# Patient Record
Sex: FEMALE | Race: WHITE | NOT HISPANIC OR LATINO | Employment: FULL TIME | URBAN - METROPOLITAN AREA
[De-identification: names, ages, dates, MRNs, and addresses within clinical notes are randomized per-mention and may not be internally consistent; named-entity substitution may affect disease eponyms.]

---

## 2023-06-14 PROBLEM — I25.119 CORONARY ARTERY DISEASE INVOLVING NATIVE HEART WITH ANGINA PECTORIS: Status: ACTIVE | Noted: 2023-06-14

## 2023-06-14 PROBLEM — I21.4 NSTEMI (NON-ST ELEVATED MYOCARDIAL INFARCTION): Status: ACTIVE | Noted: 2023-06-14

## 2023-06-15 ENCOUNTER — ANESTHESIA EVENT (OUTPATIENT)
Dept: PERIOP | Facility: HOSPITAL | Age: 63
End: 2023-06-15
Payer: COMMERCIAL

## 2023-06-15 NOTE — ANESTHESIA PREPROCEDURE EVALUATION
Anesthesia Evaluation                  Airway   Mallampati: I  TM distance: >3 FB  Neck ROM: full  No difficulty expected  Dental      Pulmonary    Cardiovascular     ECG reviewed    (+) hypertension, past MI , CAD, angina,       Neuro/Psych  (+) psychiatric history,    GI/Hepatic/Renal/Endo    (+)   diabetes mellitus,     Musculoskeletal     Abdominal    Substance History      OB/GYN          Other                      Anesthesia Plan    ASA 4     general     (A line  cvp   pac)  intravenous induction     Anesthetic plan, risks, benefits, and alternatives have been provided, discussed and informed consent has been obtained with: patient.        CODE STATUS:

## 2023-06-16 ENCOUNTER — ANESTHESIA (OUTPATIENT)
Dept: PERIOP | Facility: HOSPITAL | Age: 63
End: 2023-06-16
Payer: COMMERCIAL

## 2023-06-16 PROBLEM — E78.5 HYPERLIPIDEMIA: Status: ACTIVE | Noted: 2023-06-16

## 2023-06-16 PROBLEM — E11.9 TYPE 2 DIABETES MELLITUS: Status: ACTIVE | Noted: 2023-06-16

## 2023-06-16 PROBLEM — I10 HYPERTENSION: Status: ACTIVE | Noted: 2023-06-16

## 2023-06-16 PROBLEM — E66.9 OBESITY (BMI 30-39.9): Status: ACTIVE | Noted: 2023-06-16

## 2023-06-16 PROBLEM — Z95.1 S/P CABG X 2: Status: ACTIVE | Noted: 2023-06-16

## 2023-06-16 PROBLEM — M06.9 RHEUMATOID ARTHRITIS: Status: ACTIVE | Noted: 2023-06-16

## 2023-06-16 PROCEDURE — 25010000002 HEPARIN (PORCINE) PER 1000 UNITS: Performed by: ANESTHESIOLOGY

## 2023-06-16 PROCEDURE — P9041 ALBUMIN (HUMAN),5%, 50ML: HCPCS | Performed by: ANESTHESIOLOGY

## 2023-06-16 PROCEDURE — C1751 CATH, INF, PER/CENT/MIDLINE: HCPCS | Performed by: ANESTHESIOLOGY

## 2023-06-16 PROCEDURE — 25010000002 MIDAZOLAM PER 1 MG: Performed by: ANESTHESIOLOGY

## 2023-06-16 PROCEDURE — 93318 ECHO TRANSESOPHAGEAL INTRAOP: CPT | Performed by: ANESTHESIOLOGY

## 2023-06-16 PROCEDURE — 25010000002 CEFAZOLIN IN DEXTROSE 2-4 GM/100ML-% SOLUTION: Performed by: STUDENT IN AN ORGANIZED HEALTH CARE EDUCATION/TRAINING PROGRAM

## 2023-06-16 PROCEDURE — 25010000002 FENTANYL CITRATE (PF) 50 MCG/ML SOLUTION: Performed by: ANESTHESIOLOGY

## 2023-06-16 PROCEDURE — 25010000002 FENTANYL CITRATE (PF) 1000 MCG/20ML SOLUTION: Performed by: ANESTHESIOLOGY

## 2023-06-16 PROCEDURE — 25010000002 ACETAMINOPHEN 10 MG/ML SOLUTION: Performed by: ANESTHESIOLOGY

## 2023-06-16 PROCEDURE — 25010000002 PROTAMINE SULFATE PER 10 MG: Performed by: ANESTHESIOLOGY

## 2023-06-16 PROCEDURE — 25010000002 ALBUMIN HUMAN 5% PER 50 ML: Performed by: ANESTHESIOLOGY

## 2023-06-16 PROCEDURE — 25010000002 PROPOFOL 10 MG/ML EMULSION: Performed by: ANESTHESIOLOGY

## 2023-06-16 RX ORDER — PROTAMINE SULFATE 10 MG/ML
INJECTION, SOLUTION INTRAVENOUS AS NEEDED
Status: DISCONTINUED | OUTPATIENT
Start: 2023-06-16 | End: 2023-06-16 | Stop reason: SURG

## 2023-06-16 RX ORDER — ALBUMIN, HUMAN INJ 5% 5 %
SOLUTION INTRAVENOUS CONTINUOUS PRN
Status: DISCONTINUED | OUTPATIENT
Start: 2023-06-16 | End: 2023-06-16 | Stop reason: SURG

## 2023-06-16 RX ORDER — CALCIUM CHLORIDE 100 MG/ML
INJECTION INTRAVENOUS; INTRAVENTRICULAR AS NEEDED
Status: DISCONTINUED | OUTPATIENT
Start: 2023-06-16 | End: 2023-06-16 | Stop reason: SURG

## 2023-06-16 RX ORDER — FENTANYL CITRATE 50 UG/ML
INJECTION, SOLUTION INTRAMUSCULAR; INTRAVENOUS AS NEEDED
Status: DISCONTINUED | OUTPATIENT
Start: 2023-06-16 | End: 2023-06-16 | Stop reason: SURG

## 2023-06-16 RX ORDER — ACETAMINOPHEN 10 MG/ML
INJECTION, SOLUTION INTRAVENOUS AS NEEDED
Status: DISCONTINUED | OUTPATIENT
Start: 2023-06-16 | End: 2023-06-16 | Stop reason: SURG

## 2023-06-16 RX ORDER — ESMOLOL HYDROCHLORIDE 10 MG/ML
INJECTION INTRAVENOUS AS NEEDED
Status: DISCONTINUED | OUTPATIENT
Start: 2023-06-16 | End: 2023-06-16 | Stop reason: SURG

## 2023-06-16 RX ORDER — LABETALOL HYDROCHLORIDE 5 MG/ML
INJECTION, SOLUTION INTRAVENOUS AS NEEDED
Status: DISCONTINUED | OUTPATIENT
Start: 2023-06-16 | End: 2023-06-16 | Stop reason: SURG

## 2023-06-16 RX ORDER — ROCURONIUM BROMIDE 10 MG/ML
INJECTION, SOLUTION INTRAVENOUS AS NEEDED
Status: DISCONTINUED | OUTPATIENT
Start: 2023-06-16 | End: 2023-06-16 | Stop reason: SURG

## 2023-06-16 RX ORDER — AMINOCAPROIC ACID 250 MG/ML
INJECTION, SOLUTION INTRAVENOUS AS NEEDED
Status: DISCONTINUED | OUTPATIENT
Start: 2023-06-16 | End: 2023-06-16 | Stop reason: SURG

## 2023-06-16 RX ORDER — MIDAZOLAM HYDROCHLORIDE 1 MG/ML
INJECTION INTRAMUSCULAR; INTRAVENOUS AS NEEDED
Status: DISCONTINUED | OUTPATIENT
Start: 2023-06-16 | End: 2023-06-16 | Stop reason: SURG

## 2023-06-16 RX ORDER — LIDOCAINE HYDROCHLORIDE 10 MG/ML
INJECTION, SOLUTION EPIDURAL; INFILTRATION; INTRACAUDAL; PERINEURAL AS NEEDED
Status: DISCONTINUED | OUTPATIENT
Start: 2023-06-16 | End: 2023-06-16 | Stop reason: SURG

## 2023-06-16 RX ORDER — SODIUM CHLORIDE 9 MG/ML
INJECTION, SOLUTION INTRAVENOUS CONTINUOUS PRN
Status: DISCONTINUED | OUTPATIENT
Start: 2023-06-16 | End: 2023-06-16 | Stop reason: SURG

## 2023-06-16 RX ORDER — FENTANYL CITRATE 0.05 MG/ML
INJECTION, SOLUTION INTRAMUSCULAR; INTRAVENOUS AS NEEDED
Status: DISCONTINUED | OUTPATIENT
Start: 2023-06-16 | End: 2023-06-16 | Stop reason: SURG

## 2023-06-16 RX ORDER — HEPARIN SODIUM 1000 [USP'U]/ML
INJECTION, SOLUTION INTRAVENOUS; SUBCUTANEOUS AS NEEDED
Status: DISCONTINUED | OUTPATIENT
Start: 2023-06-16 | End: 2023-06-16 | Stop reason: SURG

## 2023-06-16 RX ORDER — PROPOFOL 10 MG/ML
VIAL (ML) INTRAVENOUS AS NEEDED
Status: DISCONTINUED | OUTPATIENT
Start: 2023-06-16 | End: 2023-06-16 | Stop reason: SURG

## 2023-06-16 RX ADMIN — FENTANYL CITRATE 250 MCG: 50 INJECTION, SOLUTION INTRAMUSCULAR; INTRAVENOUS at 10:05

## 2023-06-16 RX ADMIN — CEFAZOLIN SODIUM 2 G: 2 INJECTION, SOLUTION INTRAVENOUS at 07:24

## 2023-06-16 RX ADMIN — ROCURONIUM BROMIDE 50 MG: 10 SOLUTION INTRAVENOUS at 07:07

## 2023-06-16 RX ADMIN — SODIUM CHLORIDE: 9 INJECTION, SOLUTION INTRAVENOUS at 06:15

## 2023-06-16 RX ADMIN — ROCURONIUM BROMIDE 50 MG: 10 SOLUTION INTRAVENOUS at 08:24

## 2023-06-16 RX ADMIN — AMINOCAPROIC ACID 10 G: 250 INJECTION, SOLUTION INTRAVENOUS at 10:05

## 2023-06-16 RX ADMIN — PROTAMINE SULFATE 350 MG: 10 INJECTION, SOLUTION INTRAVENOUS at 10:04

## 2023-06-16 RX ADMIN — PROPOFOL 50 MCG/KG/MIN: 10 INJECTION, EMULSION INTRAVENOUS at 11:01

## 2023-06-16 RX ADMIN — MIDAZOLAM 5 MG: 1 INJECTION INTRAMUSCULAR; INTRAVENOUS at 07:07

## 2023-06-16 RX ADMIN — HEPARIN SODIUM 42000 UNITS: 1000 INJECTION, SOLUTION INTRAVENOUS; SUBCUTANEOUS at 08:34

## 2023-06-16 RX ADMIN — LABETALOL HYDROCHLORIDE 10 MG: 5 INJECTION, SOLUTION INTRAVENOUS at 08:26

## 2023-06-16 RX ADMIN — LIDOCAINE HYDROCHLORIDE 50 MG: 10 INJECTION, SOLUTION EPIDURAL; INFILTRATION; INTRACAUDAL; PERINEURAL at 07:07

## 2023-06-16 RX ADMIN — PROPOFOL 100 MG: 10 INJECTION, EMULSION INTRAVENOUS at 07:07

## 2023-06-16 RX ADMIN — CALCIUM CHLORIDE 0.5 G: 100 INJECTION INTRAVENOUS; INTRAVENTRICULAR at 10:04

## 2023-06-16 RX ADMIN — FENTANYL CITRATE 1000 MCG: 0.05 INJECTION, SOLUTION INTRAMUSCULAR; INTRAVENOUS at 07:07

## 2023-06-16 RX ADMIN — ACETAMINOPHEN 1000 MG: 10 INJECTION INTRAVENOUS at 10:10

## 2023-06-16 RX ADMIN — ESMOLOL HYDROCHLORIDE 20 MG: 10 INJECTION, SOLUTION INTRAVENOUS at 07:07

## 2023-06-16 RX ADMIN — FENTANYL CITRATE 250 MCG: 50 INJECTION, SOLUTION INTRAMUSCULAR; INTRAVENOUS at 08:25

## 2023-06-16 RX ADMIN — MIDAZOLAM HYDROCHLORIDE 2 MG: 1 INJECTION, SOLUTION INTRAMUSCULAR; INTRAVENOUS at 08:25

## 2023-06-16 RX ADMIN — AMINOCAPROIC ACID 10 G: 250 INJECTION, SOLUTION INTRAVENOUS at 07:07

## 2023-06-16 RX ADMIN — ALBUMIN (HUMAN): 12.5 INJECTION, SOLUTION INTRAVENOUS at 10:39

## 2023-06-16 RX ADMIN — INSULIN HUMAN 3.5 UNITS/HR: 1 INJECTION, SOLUTION INTRAVENOUS at 07:34

## 2023-06-16 NOTE — ANESTHESIA PROCEDURE NOTES
Intra-Op Anesthesia SAGE    Procedure Performed: Intra-Op Anesthesia SAGE       Start Time:        End Time:   6/16/2023 7:55 AM    Preanesthesia Checklist:  Patient identified, IV assessed, risks and benefits discussed, monitors and equipment assessed, procedure being performed at surgeon's request and anesthesia consent obtained.    General Procedure Information  SAGE Placed for monitoring purposes only -- This is not a diagnostic SAGE  Diagnostic Indications for Echo:  assessment of ascending aorta and hemodynamic monitoring  Physician Requesting Echo: Pawan Andrade MD  Location performed:  OR  Intubated  Bite block not placed  Heart visualized  Probe Insertion:  Easy  Probe Type:  Multiplane  Modalities:  2D only, color flow mapping and pulse wave Doppler    Echocardiographic and Doppler Measurements    Ventricles    Right Ventricle:  Cavity size normal.  Hypertrophy not present.  Global function normal.    Left Ventricle:  Cavity size normal.  Diastolic dimension -1 cm.  Thrombus not present.  Global Function normal.      Ventricular Regional Function:  1- Basal Anteroseptal:  normal  2- Basal Anterior:  normal  3- Basal Anterolateral:  normal  4- Basal Inferolateral:  normal  5- Basal Inferior:  normal  6- Basal Inferoseptal:  normal  7- Mid Anteroseptal:  normal  8- Mid Anterior:  normal  9- Mid Anterolateral:  normal  10- Mid Inferolateral:  normal  11- Mid Inferior:  normal  12- Mid Inferoseptal:  normal  13- Apical Anterior:  normal  14- Apical Lateral:  normal  15- Apical Inferior:  normal  16- Apical Septal:  normal  17- [place]:  normal      Valves    Aortic Valve:  Annulus normal.  Stenosis not present.  Regurgitation absent.  Leaflets normal.  Leaflet motions normal.      Mitral Valve:  Annulus normal.  Stenosis not present.  Regurgitation trace.  Leaflets normal.  Leaflet motions normal.      Tricuspid Valve:  Annulus normal.  Stenosis not present.  Regurgitation absent.  Leaflets normal.  Leaflet motions  normal.    Pulmonic Valve:  Annulus normal.  Stenosis not present.  Regurgitation absent.        Aorta    Ascending Aorta:  Size normal.  Diameter 3 cm.  Dissection not present.  Plaque thickness less than 3 mm.  Mobile plaque not present.    Aortic Arch:  Size normal.  Dissection not present.  Plaque thickness less than 3 mm.  Mobile plaque not present.    Descending Aorta:  Size normal.  Dissection not present.  Plaque thickness less than 3 mm.  Mobile plaque not present.        Atria    Right Atrium:  Spontaneous echo contrast not present.  Thrombus not present.  Tumor not present.  Device not present.      Left Atrium:  Size normal.  Spontaneous echo contrast not present.  Thrombus not present.  Tumor not present.  Device not present.    Left atrial appendage normal.      Septa        Ventricular Septum:  Intra-ventricular septum morphology normal.      Diastolic Function Measurements:  Diastolic Dysfunction Grade= indeterminate  E=  ms  A=  ms  E/A Ratio=   DT=  ms  S/D=   IVRT=    Other Findings  Pericardium:  normal  Pleural Effusion:  none  Pulmonary Arteries:  normal  Pulmonary Venous Flow:  normal    Anesthesia Information  Performed Personally

## 2023-06-16 NOTE — ANESTHESIA POSTPROCEDURE EVALUATION
Patient: Renetta Blanco    Procedure Summary     Date: 06/16/23 Room / Location:  SKIP OR  /  SKIP OR    Anesthesia Start: 0659 Anesthesia Stop:     Procedure: MEDIAN STERNOTOMY, CORONARY ARTERY BYPASS GRAFTING X 2, UTILIZING THE LEFT INTERNAL MAMMARY ARTERY  WITH EVH OF THE RIGHT GREATER SAPHENOUS VEIN AND SAGE PER ANESTHESIA (Chest) Diagnosis:       Coronary artery disease involving native heart with angina pectoris, unspecified vessel or lesion type      (Coronary artery disease involving native heart with angina pectoris, unspecified vessel or lesion type [I25.119])    Surgeons: Pawan Andrade MD Provider: Garrison Schaffer MD    Anesthesia Type: general ASA Status: 4          Anesthesia Type: general    Vitals  No vitals data found for the desired time range.          Post Anesthesia Care and Evaluation    Patient location during evaluation: ICU  Patient participation: complete - patient cannot participate  Level of consciousness: obtunded/minimal responses    Airway patency: patent  Anesthetic complications: No anesthetic complications  Respiratory status: ETT and ventilator

## 2023-06-16 NOTE — ANESTHESIA PROCEDURE NOTES
Arterial Line      Patient reassessed immediately prior to procedure    Patient location during procedure: pre-op  Start time: 6/16/2023 6:20 AM  Stop Time:6/16/2023 6:25 AM       Line placed for hemodynamic monitoring.  Performed By   Anesthesiologist: Garrison Schaffer MD   Preanesthetic Checklist  Completed: patient identified, IV checked, site marked, risks and benefits discussed, surgical consent, monitors and equipment checked, pre-op evaluation and timeout performed  Arterial Line Prep    Sterile Tech: cap, gloves and sterile barriers  Prep: ChloraPrep  Patient monitoring: blood pressure monitoring, continuous pulse oximetry and EKG  Arterial Line Procedure   Laterality:right  Location:  radial artery  Catheter size: 20 G   Guidance: palpation technique  Number of attempts: 1  Successful placement: yes   Post Assessment   Dressing Type: line sutured, occlusive dressing applied, secured with tape and wrist guard applied.   Complications no  Circ/Move/Sens Assessment: normal and unchanged.   Patient Tolerance: patient tolerated the procedure well with no apparent complications

## 2023-06-16 NOTE — ANESTHESIA PROCEDURE NOTES
Central Line      Patient reassessed immediately prior to procedure    Patient location during procedure: OR  Start time: 6/16/2023 7:15 AM  Stop Time:6/16/2023 7:25 AM  Indications: vascular access  Staff  Anesthesiologist: Garrison Schaffer MD  Preanesthetic Checklist  Completed: patient identified, IV checked, site marked, risks and benefits discussed, surgical consent, monitors and equipment checked, pre-op evaluation and timeout performed  Central Line Prep  Sterile Tech:cap, gloves, gown, mask and sterile barriers  Prep: chloraprep  Patient monitoring: blood pressure monitoring, continuous pulse oximetry and EKG  Central Line Procedure  Laterality:right  Location:internal jugular  Catheter Type:Cordis  Catheter Size:9 Fr  Guidance:ultrasound guided  PROCEDURE NOTE/ULTRASOUND INTERPRETATION.  Using ultrasound guidance the potential vascular sites for insertion of the catheter were visualized to determine the patency of the vessel to be used for vascular access.  After selecting the appropriate site for insertion, the needle was visualized under ultrasound being inserted into the internal jugular vein, followed by ultrasound confirmation of wire and catheter placement. There were no abnormalities seen on ultrasound; an image was taken; and the patient tolerated the procedure with no complications. Images: still images obtained, printed/placed on chart  Assessment  Post procedure:biopatch applied, line sutured, occlusive dressing applied and secured with tape  Assessement:blood return through all ports, free fluid flow, chest x-ray ordered and Jimenez Test  Complications:no  Patient Tolerance:patient tolerated the procedure well with no apparent complications

## 2023-07-27 ENCOUNTER — OFFICE VISIT (OUTPATIENT)
Dept: CARDIAC SURGERY | Facility: CLINIC | Age: 63
End: 2023-07-27
Payer: COMMERCIAL

## 2023-07-27 VITALS
DIASTOLIC BLOOD PRESSURE: 60 MMHG | WEIGHT: 203 LBS | TEMPERATURE: 99.8 F | HEART RATE: 81 BPM | BODY MASS INDEX: 32.78 KG/M2 | OXYGEN SATURATION: 96 % | SYSTOLIC BLOOD PRESSURE: 132 MMHG

## 2023-07-27 DIAGNOSIS — Z95.1 S/P CABG X 2: Primary | ICD-10-CM

## 2023-07-27 PROCEDURE — 99024 POSTOP FOLLOW-UP VISIT: CPT | Performed by: REGISTERED NURSE

## 2023-07-27 NOTE — PROGRESS NOTES
[facility] Cardiothoracic Surgery Office Follow Up Note    Date of Encounter: 2023     Name: Renetta Blanco  : 1960     Referred By: No ref. provider found  PCP: Brynn Meier APRN    Chief Complaint:    Chief Complaint   Patient presents with    Coronary Artery Disease     Follow-up s/p CABGx2 23 and to discuss return to work. Patient has a lot of pain in left knee due to arthritis. Patient having issues with walking due to this. She sees [facility] [place].        Subjective      History of Present Illness:    It was nice to see Renetta Blanco in follow up.  She is a pleasant 63 y.o. female with PMH significant for hypertension, type 2 diabetes mellitus, hyperlipidemia on statin therapy, rheumatoid arthritis, chronic iron deficiency anemia, anxiety/depression, coronary artery disease and STEMI.       Patient is s/p coronary artery bypass grafting x2 utilizing RSVG by Dr. Andrade on 2023.  See op report for full details.  She did well post-operatively.  On postop day #5 patient met required criteria and was deemed appropriate for discharge home, without readmission.  Patient was discharged on statin, Plavix, beta-blocker, and aspirin.     Ms. Blanco was seen in office by myself at 1 week post-op at which time she was progressing as expected without complication.  Patient was last seen on  by TYESHA Castellon at which time she was doing well.  Staples were removed from RSVG without complication.  Patient was placed on as-needed basis follow-up.  Ms. Blanco presents to office today for scheduled appointment without complaint.  It appears patient was scheduled twice.  She continues to recover well at 6 weeks.  Her biggest complaint today is her arthritis which is managed by rheumatology.      Review of Systems:  Review of Systems   Constitutional: Negative for chills, decreased appetite, diaphoresis, fever, malaise/fatigue, night sweats, weight gain and  weight loss.   HENT:  Negative for hoarse voice.    Eyes:  Negative for blurred vision, double vision and visual disturbance.   Cardiovascular:  Positive for leg swelling. Negative for chest pain, claudication, dyspnea on exertion, irregular heartbeat, near-syncope, orthopnea, palpitations, paroxysmal nocturnal dyspnea and syncope.   Respiratory:  Positive for cough. Negative for hemoptysis, shortness of breath, sputum production and wheezing.    Hematologic/Lymphatic: Negative for adenopathy and bleeding problem. Does not bruise/bleed easily.   Skin:  Negative for color change, nail changes, poor wound healing and rash.   Musculoskeletal:  Positive for arthritis, back pain and joint pain. Negative for falls and muscle cramps.   Gastrointestinal:  Negative for abdominal pain, dysphagia and heartburn.   Genitourinary:  Negative for flank pain.   Neurological:  Positive for dizziness (in the mornings). Negative for brief paralysis, disturbances in coordination, focal weakness, headaches, light-headedness, loss of balance, numbness, paresthesias, sensory change, vertigo and weakness.   Psychiatric/Behavioral:  Negative for depression and suicidal ideas. The patient is not nervous/anxious.    Allergic/Immunologic: Negative for persistent infections.     I have reviewed the following portions of the patient's history: problem list, current medications, allergies, past surgical history, past medical history, past social history, past family history, and ROS and confirm it's accurate.    Allergies:  Allergies   Allergen Reactions    Amlodipine Swelling     Only on max dose of 10 mg.    Codeine Other (See Comments)     nightmares    Adhesive Tape Rash       Medications:      Current Outpatient Medications:     Abatacept (Orencia ClickJect) 125 MG/ML solution auto-injector, Inject 1 mL under the skin into the appropriate area as directed Every 7 (Seven) Days., Disp: , Rfl:     aspirin 81 MG chewable tablet, Chew 1 tablet  Daily., Disp: , Rfl:     atorvastatin (LIPITOR) 40 MG tablet, Take 1 tablet by mouth Every Night., Disp: 90 tablet, Rfl: 0    baclofen (LIORESAL) 10 MG tablet, Take 1 tablet by mouth As Needed., Disp: , Rfl:     clopidogrel (PLAVIX) 75 MG tablet, Take 1 tablet by mouth Daily., Disp: 30 tablet, Rfl: 5    coenzyme Q10 100 MG capsule, Take 1 capsule by mouth Daily., Disp: , Rfl:     denosumab (PROLIA) 60 MG/ML solution prefilled syringe syringe, Inject 1 mL under the skin into the appropriate area as directed. Once every 6 months, Disp: , Rfl:     Dulaglutide (Trulicity) 0.75 MG/0.5ML solution pen-injector, Inject 0.75 mg under the skin into the appropriate area as directed 1 (One) Time Per Week., Disp: , Rfl:     ferrous sulfate 325 (65 FE) MG tablet, Take 1 tablet by mouth Daily With Breakfast., Disp: 30 tablet, Rfl: 0    glyburide (DIAbeta) 5 MG tablet, Take 1 tablet by mouth 2 (Two) Times a Day., Disp: , Rfl:     levocetirizine (XYZAL) 5 MG tablet, Take 1 tablet by mouth As Needed., Disp: , Rfl:     losartan (COZAAR) 50 MG tablet, Take 1 tablet by mouth Daily., Disp: , Rfl:     metoprolol tartrate (LOPRESSOR) 25 MG tablet, Take 1 tablet by mouth 2 (Two) Times a Day. 1/2 tab BID, Disp: , Rfl:     montelukast (SINGULAIR) 10 MG tablet, Take 1 tablet by mouth As Needed., Disp: , Rfl:     Omega-3 Fatty Acids (fish oil) 1000 MG capsule capsule, Take 1 capsule by mouth Daily With Breakfast., Disp: , Rfl:     sertraline (ZOLOFT) 25 MG tablet, Take 1 tablet by mouth Daily., Disp: , Rfl:     History:   Past Medical History:   Diagnosis Date    Anxiety     Arthritis     OA and RA    CAD (coronary artery disease)     Diabetes mellitus     Heart murmur     History of transfusion     1978    Hypertension     Rheumatoid arthritis     Seasonal allergies        Past Surgical History:   Procedure Laterality Date    CARDIAC CATHETERIZATION N/A 06/14/2023    Procedure: Left Heart Cath;  Surgeon: Enrique Alvarez MD;  Location: [facility]  CATH INVASIVE LOCATION;  Service: Cardiology;  Laterality: N/A;    CHOLECYSTECTOMY      CORONARY ARTERY BYPASS GRAFT N/A 6/16/2023    Procedure: MEDIAN STERNOTOMY, CORONARY ARTERY BYPASS GRAFTING X 2, UTILIZING THE LEFT INTERNAL MAMMARY ARTERY  WITH EVH OF THE RIGHT GREATER SAPHENOUS VEIN AND SAGE PER ANESTHESIA;  Surgeon: Pawan Andrade MD;  Location[facility];  Service: Cardiothoracic;  Laterality: N/A;    FEMUR SURGERY Left     has metal ramona    TUBAL ABDOMINAL LIGATION         Social History     Socioeconomic History    Marital status: Significant Other    Number of children: 2   Tobacco Use    Smoking status: Never    Smokeless tobacco: Never   Vaping Use    Vaping Use: Never used   Substance and Sexual Activity    Alcohol use: Yes     Alcohol/week: 1.0 standard drink     Types: 1 Drinks containing 0.5 oz of alcohol per week    Drug use: Never    Sexual activity: Defer        Family History   Problem Relation Age of Onset    Diabetes Mother     Cerebral aneurysm Father     Heart failure Sister     No Known Problems Maternal Grandmother     No Known Problems Maternal Grandfather     No Known Problems Paternal Grandmother     No Known Problems Paternal Grandfather        Objective     Physical Exam:  Vitals:    07/27/23 1256 07/27/23 1257   BP: 122/59 132/60   BP Location: Right arm Left arm   Patient Position: Sitting Sitting   Pulse: 81    Temp: 99.8 °F (37.7 °C)    SpO2: 96%    Weight: 92.1 kg (203 lb)       Body mass index is 32.78 kg/m².    Physical Exam  Vitals reviewed.   Constitutional:       Appearance: Normal appearance.   Eyes:      Pupils: Pupils are equal, round, and reactive to light.   Cardiovascular:      Rate and Rhythm: Normal rate and regular rhythm.      Heart sounds: Normal heart sounds. No murmur heard.  Pulmonary:      Effort: Pulmonary effort is normal.      Breath sounds: Normal breath sounds.   Musculoskeletal:      Right lower leg: No edema.      Left lower leg: No edema.   Skin:      General: Skin is warm and dry.   Neurological:      General: No focal deficit present.      Mental Status: She is alert and oriented to person, place, and time.   Psychiatric:         Mood and Affect: Mood normal.         Behavior: Behavior normal.         Thought Content: Thought content normal.         Judgment: Judgment normal.       Imaging/Labs:  No imaging/labs ordered for this encounter.        Assessment / Plan      Assessment / Plan:  PMH significant for hypertension, type 2 diabetes mellitus, hyperlipidemia on statin therapy, rheumatoid arthritis, chronic iron deficiency anemia, anxiety/depression, coronary artery disease and STEMI.      Coronary artery disease  STEMI  S/p CABG x 2  S/p coronary artery bypass grafting x 2 utilizing RSVG by Dr. Andrade on 6/16/2023.  Deemed appropriate for discharge home on POD #5, without readmission.  Seen in office at 1 week post-op at which time she was progressing as expected without complication.   Last seen in office on 7/13 by at which time she was doing well.  Staples were removed from RSVG without complication.  Placed on as-needed basis follow-up.  Presents to office today for scheduled appointment without complaint.  Appears patient was scheduled twice.  Mid-sternotomy incision:  wound edges well approximated, no erythema, edema, or induration.  Mediastinal tubes sites:  well healing without erythema, edema, or drainage.  Endoscopic vein harvest site:  well healing without erythema, edema, or drainage.  Continues to recover well at 6 weeks.  Her biggest complaint today is her arthritis which is managed by rheumatology.  Has follow-up scheduled with cardiology in the near future.  Continue statin, Plavix, beta-blocker, and aspirin.     Patient Education:  You may resume driving at 6 weeks from your surgery date.  Please plan to stop every 2 hours to ambulate if driving or flying long distances.  No lifting over 10 lbs for 12 weeks (3 months).  After this time you  can slowly increase your weight as tolerated.  You should not partake in any overhead activities or movements that involve twisting or jarring of your upper chest and torso such as (but not limited to) -- golfing, tennis, lawn mowing including zero turn mowers, use of lawn trimmers or edgers, shoveling, painting, vacuuming, mopping, sweeping, shooting a gun, etc.  Continue to keep your incisions clean and dry.  Use plain antibacterial soap (i.e. dial) and water to clean and pat dry.    Do no apply any lotions, creams, oils, powders, salves, or ointments directly to incisional sites.  Please watch for signs and symptoms of infection which may include but are not limited to: increased pain or tenderness around your incision, warmth at the site or fever, redness or red streaking, and foul smelling or appearing drainage.  If your incision opens up please contact our office.    Instructions and post-operative restrictions verbally reviewed -- patient verbalized understanding.    Follow Up:   We will follow on an as-needed basis.  Or sooner for any further concerns or worsening sign and symptoms.  Patient encouraged to call the office with any questions or concerns.     Thank you for allowing me to participate in your care.  Best Regards,    TYESHA Negro  [facility] Cardiothoracic Surgery  07/27/23  12:58 EDT

## 2023-08-03 ENCOUNTER — LAB (OUTPATIENT)
Dept: LAB | Facility: HOSPITAL | Age: 63
End: 2023-08-03
Payer: COMMERCIAL

## 2023-08-03 ENCOUNTER — OFFICE VISIT (OUTPATIENT)
Dept: CARDIOLOGY | Facility: CLINIC | Age: 63
End: 2023-08-03
Payer: COMMERCIAL

## 2023-08-03 VITALS
BODY MASS INDEX: 31.82 KG/M2 | OXYGEN SATURATION: 94 % | WEIGHT: 198 LBS | DIASTOLIC BLOOD PRESSURE: 58 MMHG | SYSTOLIC BLOOD PRESSURE: 90 MMHG | HEIGHT: 66 IN | HEART RATE: 82 BPM

## 2023-08-03 DIAGNOSIS — I25.810 CORONARY ARTERY DISEASE INVOLVING AUTOLOGOUS VEIN CORONARY BYPASS GRAFT WITHOUT ANGINA PECTORIS: Primary | ICD-10-CM

## 2023-08-03 DIAGNOSIS — D64.9 ANEMIA, UNSPECIFIED TYPE: ICD-10-CM

## 2023-08-03 DIAGNOSIS — I95.89 OTHER SPECIFIED HYPOTENSION: ICD-10-CM

## 2023-08-03 DIAGNOSIS — E78.5 DYSLIPIDEMIA: ICD-10-CM

## 2023-08-03 LAB
DEPRECATED RDW RBC AUTO: 41.6 FL (ref 37–54)
ERYTHROCYTE [DISTWIDTH] IN BLOOD BY AUTOMATED COUNT: 14.5 % (ref 12.3–15.4)
HCT VFR BLD AUTO: 36.5 % (ref 34–46.6)
HGB BLD-MCNC: 11.1 G/DL (ref 12–15.9)
MCH RBC QN AUTO: 24.1 PG (ref 26.6–33)
MCHC RBC AUTO-ENTMCNC: 30.4 G/DL (ref 31.5–35.7)
MCV RBC AUTO: 79.3 FL (ref 79–97)
PLATELET # BLD AUTO: 375 10*3/MM3 (ref 140–450)
PMV BLD AUTO: 10.2 FL (ref 6–12)
RBC # BLD AUTO: 4.6 10*6/MM3 (ref 3.77–5.28)
WBC NRBC COR # BLD: 8.77 10*3/MM3 (ref 3.4–10.8)

## 2023-08-03 PROCEDURE — 36415 COLL VENOUS BLD VENIPUNCTURE: CPT

## 2023-08-03 PROCEDURE — 80053 COMPREHEN METABOLIC PANEL: CPT

## 2023-08-03 PROCEDURE — 85027 COMPLETE CBC AUTOMATED: CPT

## 2023-08-03 RX ORDER — LOSARTAN POTASSIUM 25 MG/1
25 TABLET ORAL DAILY
Qty: 90 TABLET | Refills: 3
Start: 2023-08-03

## 2023-08-04 ENCOUNTER — TELEPHONE (OUTPATIENT)
Dept: CARDIAC SURGERY | Facility: CLINIC | Age: 63
End: 2023-08-04

## 2023-08-04 ENCOUNTER — DOCUMENTATION (OUTPATIENT)
Dept: CARDIAC REHAB | Facility: HOSPITAL | Age: 63
End: 2023-08-04
Payer: COMMERCIAL

## 2023-08-04 LAB
ALBUMIN SERPL-MCNC: 3.7 G/DL (ref 3.5–5.2)
ALBUMIN/GLOB SERPL: 1 G/DL
ALP SERPL-CCNC: 102 U/L (ref 39–117)
ALT SERPL W P-5'-P-CCNC: 16 U/L (ref 1–33)
ANION GAP SERPL CALCULATED.3IONS-SCNC: 11.7 MMOL/L (ref 5–15)
AST SERPL-CCNC: 14 U/L (ref 1–32)
BILIRUB SERPL-MCNC: 0.4 MG/DL (ref 0–1.2)
BUN SERPL-MCNC: 18 MG/DL (ref 8–23)
BUN/CREAT SERPL: 17.1 (ref 7–25)
CALCIUM SPEC-SCNC: 9.9 MG/DL (ref 8.6–10.5)
CHLORIDE SERPL-SCNC: 93 MMOL/L (ref 98–107)
CO2 SERPL-SCNC: 27.3 MMOL/L (ref 22–29)
CREAT SERPL-MCNC: 1.05 MG/DL (ref 0.57–1)
EGFRCR SERPLBLD CKD-EPI 2021: 59.8 ML/MIN/1.73
GLOBULIN UR ELPH-MCNC: 3.7 GM/DL
GLUCOSE SERPL-MCNC: 445 MG/DL (ref 65–99)
POTASSIUM SERPL-SCNC: 4.2 MMOL/L (ref 3.5–5.2)
PROT SERPL-MCNC: 7.4 G/DL (ref 6–8.5)
SODIUM SERPL-SCNC: 132 MMOL/L (ref 136–145)

## 2023-08-04 NOTE — TELEPHONE ENCOUNTER
Caller: Renetta Blanco    Relationship: Self    Best call back number: [phone]     What form or medical record are you requesting: RETURN TO WORK     Who is requesting this form or medical record from you: [employer]     How would you like to receive the form or medical records (pick-up, mail, fax):  PATIENT WOULD LIKE IT MAILED TO HOME ADDRESS       Timeframe paperwork needed: ASAP   RETURN TO WORK ON 8/7/23    Additional notes:  PATIENT REQUEST THAT IT LIST ANY RESTRICTIONS

## 2023-08-07 ENCOUNTER — TELEPHONE (OUTPATIENT)
Dept: CARDIOLOGY | Facility: CLINIC | Age: 63
End: 2023-08-07
Payer: COMMERCIAL

## 2023-08-07 NOTE — TELEPHONE ENCOUNTER
Spoke with patient regarding lab results, patient verbalizes understanding and is going to call her PCP

## 2023-09-07 RX ORDER — ATORVASTATIN CALCIUM 40 MG/1
40 TABLET, FILM COATED ORAL NIGHTLY
Qty: 90 TABLET | Refills: 3 | Status: SHIPPED | OUTPATIENT
Start: 2023-09-07

## 2023-09-07 RX ORDER — CLOPIDOGREL BISULFATE 75 MG/1
75 TABLET ORAL DAILY
Qty: 90 TABLET | Refills: 3 | Status: SHIPPED | OUTPATIENT
Start: 2023-09-07

## 2023-09-07 RX ORDER — LOSARTAN POTASSIUM 25 MG/1
25 TABLET ORAL DAILY
Qty: 90 TABLET | Refills: 3
Start: 2023-09-07

## 2023-09-07 NOTE — TELEPHONE ENCOUNTER
"Caller: Renetta Blanco    Relationship: Self    Best call back number: [phone]    Requested Prescriptions:   Requested Prescriptions     Pending Prescriptions Disp Refills    metoprolol tartrate (LOPRESSOR) 25 MG tablet       Sig: Take 1 tablet by mouth 2 (Two) Times a Day. 1/2 tab BID    losartan (COZAAR) 25 MG tablet 90 tablet 3     Sig: Take 1 tablet by mouth Daily.        Pharmacy where request should be sent[id number] [place], [place] - [address] [facility] [phone]  - [phone] FX     Last office visit with prescribing clinician: Visit date not found   Last telemedicine visit with prescribing clinician: Visit date not found   Next office visit with prescribing clinician: 2/8/2024     Additional details provided by patient: HAS 1 METOPROLOL, \"A FEW DAYS\" OF LOSARTAN PATIENT REQUESTED 2 MORE PRESCRIPTIONS AFTER THIS ONE WAS SENT. HUB TRIED TO ADDEND SHE NEEDS PLAVIX AND LIPITOR    Does the patient have less than a 3 day supply:  [x] Yes  [] No    Would you like a call back once the refill request has been completed: [] Yes [] No    If the office needs to give you a call back, can they leave a voicemail: [] Yes [] No    Lorraine Person   09/07/23 15:19 EDT         "

## 2023-09-12 ENCOUNTER — TELEPHONE (OUTPATIENT)
Dept: CARDIAC SURGERY | Facility: CLINIC | Age: 63
End: 2023-09-12

## 2023-09-12 NOTE — TELEPHONE ENCOUNTER
Caller: Renetta Blanco Beverley    Relationship: Self    Best call back number:     [phone]        What form or medical record are you requesting: PATIENT CALLED [facility] STATING THAT SHE WAS AWAITING A CALL FROM THE OFFICE . PATIENT STATED THAT SHE HAS CRITICAL CARE INSURANCE NAMED [employer]. [employer] HAD REQUESTED MEDICAL RECORDS FROM DR. PARKINSON AND PATIENT WAS CHECKING ON [employer] OF PAPERWORK      Who is requesting this form or medical record from you: [employer]    How would you like to receive the form or medical records (pick-up, mail, fax): FAX  If fax, what is the fax number: [phone] ATTN: JODIE    Timeframe paperwork needed: ASAP

## 2023-09-18 ENCOUNTER — TELEPHONE (OUTPATIENT)
Dept: CARDIOLOGY | Facility: CLINIC | Age: 63
End: 2023-09-18
Payer: COMMERCIAL

## 2023-09-18 NOTE — TELEPHONE ENCOUNTER
Pt called this morning with blood pressure concerns, she is having issues with hypotension. Blood pressure has been 80-90 systolic. Once she lays down and rest for a little while it will go back up to high 90s low 100s.    Last office visit we cut her losartan to  25mg daily.  She is taking lopressor 12.5 BID.    You had mention we could change her lopressor to metoprolol succinate.    Please advise.

## 2023-09-18 NOTE — TELEPHONE ENCOUNTER
Caller: Renetta Blanco    Relationship: Self    Best call back number: [phone]    What is the best time to reach you: ANY    Who are you requesting to speak with (clinical staff, provider,  specific staff member): ANY      What was the call regarding: PT HAS BEEN GETTING DIZZY EVERY MORNING AND HER BP HAS BEEN LOW AND DOESN'T GO BACK UP UNTIL SHE RESTS. PT WANTS TO KNOW IF THE METOPROLOL COULD HAVE ANYTHING TO DO WITH IT.     Is it okay if the provider responds through MyChart: NO

## 2023-09-18 NOTE — TELEPHONE ENCOUNTER
Glucose was wildly out of control the last visit she had.  She can hold her losartan for now but if she is urinating quite a bit and losing fluid due to her eye high glucose she needs to follow-up with her PCP regarding this.  She can hold her Lopressor if her systolic blood pressure is less than 100.

## 2023-09-18 NOTE — TELEPHONE ENCOUNTER
Pt made aware with plan of care, pt has appointment with her PCP on the 27th to follow up with glucose levels. I also educated pt on a heart healthy diabetic diet. She is going to follow up with us next week to let us know how her BP is doing. Pt verbalizes understanding

## 2024-02-07 NOTE — PROGRESS NOTES
Follow-up Visit      Date: 2024  Patient Name: Renetta Blanco  : 1960   MRN: [id number]     Chief Complaint:    Chief Complaint   Patient presents with    Coronary Artery Disease     Coronary artery disease involving autologous vein coronary bypass graft without angina pectoris           History of Present Illness: Renetta Blanco is a 63 y.o. female who is here today for follow-up on her coronary artery disease.  Patient has been doing much better.  Patient underwent successful revascularization followed by cardiac rehab.  She has completed her 36 sessions of cardiac rehab.  She denies any chest pain any shortness of breath any dizziness any palpitations.  He has been feeling much better.  Her main problem is her arthritis at this time also.    She has been watching her diet very carefully and has been successfully losing weight also.      Problem List     CARDIAC  Coronary Artery Disease:   Lexiscan 2023: Moderately reduced perfusion to the mid apical segment that is fixed.  LHC 2023: Severe left main disease  CABG x 2 23: LIMA to LAD, SVG to the OM    Myocardium:   Echo 2023: EF normal     Valvular:   No significant valvular abnormalities     Electrical:   Normal sinus rhythm with PVCs     Percardium:   Normal    VASCULAR  Cardiovascular disease  Carotid duplex 2023: Less than 50% stenosis bilaterally    CARDIAC RISK FACTORS:  Hypertension  Diabetes  2023 A1C 10.8  Dyslipidemia  2023 TG 51  LDL 73 HDL 52  10/2023   HDL 53 LDL 54  Obesity  Family History    NON-CARDIAC:  Anxiety/depression  Rheumatoid arthritis    SURGERIES:  Cholecystectomy  Tubal ligation  Femur fracture with ramona placement and left leg      Subjective      Review of Systems:   Review of Systems   Respiratory: Negative.     Cardiovascular:  Positive for leg swelling.       Medications:     Current Outpatient Medications:     Abatacept (Orencia ClickJect) 125 MG/ML solution  auto-injector, Inject 1 mL under the skin into the appropriate area as directed Every 7 (Seven) Days., Disp: , Rfl:     aspirin 81 MG chewable tablet, Chew 1 tablet Daily., Disp: , Rfl:     atorvastatin (LIPITOR) 40 MG tablet, Take 1 tablet by mouth Every Night., Disp: 90 tablet, Rfl: 3    baclofen (LIORESAL) 10 MG tablet, Take 1 tablet by mouth As Needed., Disp: , Rfl:     Cholecalciferol 50 MCG (2000 UT) capsule, Take 50 mcg by mouth Daily., Disp: , Rfl:     clopidogrel (PLAVIX) 75 MG tablet, Take 1 tablet by mouth Daily., Disp: 90 tablet, Rfl: 3    coenzyme Q10 100 MG capsule, Take 1 capsule by mouth Daily., Disp: , Rfl:     denosumab (PROLIA) 60 MG/ML solution prefilled syringe syringe, Inject 1 mL under the skin into the appropriate area as directed. Once every 6 months, Disp: , Rfl:     Dulaglutide (Trulicity) 0.75 MG/0.5ML solution pen-injector, Inject 0.75 mg under the skin into the appropriate area as directed 1 (One) Time Per Week., Disp: , Rfl:     famotidine (Pepcid) 40 MG tablet, Take 1 tablet by mouth 2 (Two) Times a Day As Needed., Disp: , Rfl:     Farxiga 10 MG tablet, Take 10 mg by mouth Daily., Disp: , Rfl:     folic acid (FOLVITE) 1 MG tablet, Take 1 tablet by mouth Daily., Disp: , Rfl:     glyburide (DIAbeta) 5 MG tablet, Take 1 tablet by mouth 2 (Two) Times a Day., Disp: , Rfl:     Lancets (OneTouch Delica Plus [id number]) misc, 2 (Two) Times a Day. use for testing, Disp: , Rfl:     levocetirizine (XYZAL) 5 MG tablet, Take 1 tablet by mouth As Needed., Disp: , Rfl:     methotrexate 2.5 MG tablet, Take 1 tablet by mouth 3 (Three) Doses Each Week. Take Doses 12 (Twelve) Hours Apart., Disp: , Rfl:     methylPREDNISolone (MEDROL) 4 MG dose pack, Take  by mouth Daily. follow package directions, Disp: , Rfl:     metoprolol tartrate (LOPRESSOR) 25 MG tablet, Take 0.5 tablets by mouth 2 (Two) Times a Day., Disp: 90 tablet, Rfl: 3    montelukast (SINGULAIR) 10 MG tablet, Take 1 tablet by mouth As Needed.,  "Disp: , Rfl:     Omega-3 Fatty Acids (fish oil) 1000 MG capsule capsule, Take 1 capsule by mouth Daily With Breakfast., Disp: , Rfl:     OneTouch Verio test strip, USE TO TEST BLOOD SUGAR TWICE DAILY, Disp: , Rfl:     sertraline (ZOLOFT) 25 MG tablet, Take 1 tablet by mouth Daily., Disp: , Rfl:     Allergies:   Allergies   Allergen Reactions    Amlodipine Swelling     Only on max dose of 10 mg.    Codeine Other (See Comments)     nightmares    Adhesive Tape Rash       Objective     Physical Exam:  Vitals:    02/08/24 1324   BP: 118/52   Pulse: 73   SpO2: 97%   Weight: 79.7 kg (175 lb 12.8 oz)   Height: 167.6 cm (66\")     Body mass index is 28.37 kg/m².      Constitutional:       General: Not in acute distress.     Appearance: Healthy appearance. Not in distress.     Neck:     JVP: Not elevated      Carotid artery: No carotid bruit    Pulmonary:      Effort: Pulmonary effort is normal.      Breath sounds: Normal breath sounds. No wheezing. No rhonchi. No rales.     Cardiovascular:      Regular rate.  Regular rhythm. Normal S1. Normal S2.      Murmurs: There is no significant murmur.      No gallop. No click. No rub.     Abdominal:      General: Bowel sounds are normal.      Palpations: Abdomen is soft.      Tenderness: There is no abdominal tenderness.    Extremities:     Pulses: Good distal pulses     Edema: No edema    Smoking Cessation:   Tobacco Product History : Patient never smoked    Lab Review:   Lab Results   Component Value Date    GLUCOSE 445 (C) 08/03/2023    BUN 18 08/03/2023    CREATININE 1.05 (H) 08/03/2023    BCR 17.1 08/03/2023    K 4.2 08/03/2023    CO2 27.3 08/03/2023    CALCIUM 9.9 08/03/2023    ALBUMIN 3.7 08/03/2023    AST 14 08/03/2023    ALT 16 08/03/2023     Lab Results   Component Value Date    WBC 8.77 08/03/2023    HGB 11.1 (L) 08/03/2023    HCT 36.5 08/03/2023    MCV 79.3 08/03/2023     08/03/2023             ECG 12 Lead    Date/Time: 2/8/2024 1:29 PM  Performed by: Kim" MD Enrique    Authorized by: Enrique Alvarez MD  Comparison: compared with previous ECG   Similar to previous ECG  Rhythm: sinus rhythm  Other findings: T wave abnormality    Clinical impression: abnormal EKG           Assessment / Plan      Assessment:   Diagnosis Plan   1. Coronary artery disease involving native coronary artery of native heart with angina pectoris        2. Primary hypertension        3. Type 2 diabetes mellitus with hyperglycemia, without long-term current use of insulin             Plan:  I have advised patient to continue taking aspirin and Plavix for at least a year if not more.  She has been taking Lipitor for her cholesterol and will continue following her lipid profile.  We would like to have her LDL less than 55.  She is going to continue to follow her diabetes with the primary care physician.      Follow Up:       Return in about 1 year (around 2/8/2025).    Enrique Alvarez MD

## 2024-02-08 ENCOUNTER — OFFICE VISIT (OUTPATIENT)
Dept: CARDIOLOGY | Facility: CLINIC | Age: 64
End: 2024-02-08
Payer: COMMERCIAL

## 2024-02-08 VITALS
WEIGHT: 175.8 LBS | OXYGEN SATURATION: 97 % | DIASTOLIC BLOOD PRESSURE: 52 MMHG | SYSTOLIC BLOOD PRESSURE: 118 MMHG | HEART RATE: 73 BPM | BODY MASS INDEX: 28.25 KG/M2 | HEIGHT: 66 IN

## 2024-02-08 DIAGNOSIS — I10 PRIMARY HYPERTENSION: ICD-10-CM

## 2024-02-08 DIAGNOSIS — I25.119 CORONARY ARTERY DISEASE INVOLVING NATIVE CORONARY ARTERY OF NATIVE HEART WITH ANGINA PECTORIS: Primary | ICD-10-CM

## 2024-02-08 DIAGNOSIS — E11.65 TYPE 2 DIABETES MELLITUS WITH HYPERGLYCEMIA, WITHOUT LONG-TERM CURRENT USE OF INSULIN: ICD-10-CM

## 2024-02-08 PROCEDURE — 93000 ELECTROCARDIOGRAM COMPLETE: CPT | Performed by: INTERNAL MEDICINE

## 2024-02-08 PROCEDURE — 99214 OFFICE O/P EST MOD 30 MIN: CPT | Performed by: INTERNAL MEDICINE

## 2024-02-08 RX ORDER — METHOTREXATE 2.5 MG/1
2.5 TABLET ORAL
COMMUNITY
Start: 2024-01-16

## 2024-02-08 RX ORDER — FAMOTIDINE 40 MG/1
40 TABLET, FILM COATED ORAL 2 TIMES DAILY PRN
COMMUNITY
Start: 2024-01-30

## 2024-02-08 RX ORDER — METHYLPREDNISOLONE 4 MG/1
TABLET ORAL DAILY
COMMUNITY
Start: 2023-12-21

## 2024-02-08 RX ORDER — DAPAGLIFLOZIN 10 MG/1
1 TABLET, FILM COATED ORAL DAILY
COMMUNITY
Start: 2024-01-12

## 2024-02-08 RX ORDER — BLOOD SUGAR DIAGNOSTIC
STRIP MISCELLANEOUS
COMMUNITY
Start: 2023-12-19

## 2024-02-08 RX ORDER — FOLIC ACID 1 MG/1
1 TABLET ORAL DAILY
COMMUNITY

## 2024-02-08 RX ORDER — LANCETS 33 GAUGE
EACH MISCELLANEOUS 2 TIMES DAILY
COMMUNITY
Start: 2023-12-21

## 2024-05-13 RX ORDER — FAMOTIDINE 40 MG/1
40 TABLET, FILM COATED ORAL AS NEEDED
Qty: 120 TABLET | Refills: 0 | Status: SHIPPED | OUTPATIENT
Start: 2024-05-13

## 2024-06-03 RX ORDER — CLOPIDOGREL BISULFATE 75 MG/1
75 TABLET ORAL DAILY
Qty: 90 TABLET | Refills: 3 | Status: SHIPPED | OUTPATIENT
Start: 2024-06-03

## 2024-06-03 RX ORDER — ATORVASTATIN CALCIUM 40 MG/1
40 TABLET, FILM COATED ORAL NIGHTLY
Qty: 90 TABLET | Refills: 3 | Status: SHIPPED | OUTPATIENT
Start: 2024-06-03

## 2024-07-02 ENCOUNTER — TELEPHONE (OUTPATIENT)
Age: 64
End: 2024-07-02
Payer: COMMERCIAL

## 2024-07-02 NOTE — TELEPHONE ENCOUNTER
Incoming Refill Request      Medication requested (name and dose): methotrexate 2.5 MG tablet     denosumab (PROLIA) 60 MG/ML solution prefilled syringe syringe     Pharmacy where request should be sent: [facility] [phone]    Additional details provided by patient: prolia RX goes to [facility]    Best call back number: [phone]     Does the patient have less than a 3 day supply:  [x] Yes  [] No    Lorraine Zamarripa Rep  07/02/24, 14:11 EDT

## 2024-07-09 RX ORDER — METHOTREXATE 2.5 MG/1
15 TABLET ORAL WEEKLY
Qty: 24 TABLET | Refills: 2 | Status: SHIPPED | OUTPATIENT
Start: 2024-07-09

## 2024-07-09 RX ORDER — METHOTREXATE 2.5 MG/1
15 TABLET ORAL WEEKLY
Qty: 24 TABLET | Refills: 2 | Status: SHIPPED | OUTPATIENT
Start: 2024-07-09 | End: 2024-07-09 | Stop reason: SDUPTHER

## 2024-07-12 PROBLEM — R53.83 OTHER FATIGUE: Status: ACTIVE | Noted: 2024-07-12

## 2024-07-12 PROBLEM — M17.0 BILATERAL PRIMARY OSTEOARTHRITIS OF KNEE: Status: ACTIVE | Noted: 2024-07-12

## 2024-07-12 PROBLEM — M81.0 OSTEOPOROSIS WITHOUT CURRENT PATHOLOGICAL FRACTURE: Status: ACTIVE | Noted: 2024-07-12

## 2024-07-12 PROBLEM — Z79.899 HIGH RISK MEDICATION USE: Status: ACTIVE | Noted: 2024-07-12

## 2024-07-12 NOTE — ASSESSMENT & PLAN NOTE
Positive H/o vitamin D deficiency and steroid use. Positive RA,  Slip and fall Jan 2018 caused femur fracture - L , poor bone healing and had to use a bone stimulator.  Bone density scan 8/18 showed Tscore of the R femoral neck -2.2, Total hip -2.0 and spine normal.  DEXA 11/21/2023 - R Total hip -3.9, femoral neck -4, femoral neck decreased by 35%, total decreased 35.8%, spine normal - unchanged. VFA old T11 compression fracture.   Last Prolia 9/2023 11/23 osteocalcin normal, SPEP negative, PTH normal, TSH and T4 normal, D 43.7    Plan:  800-1000mg of Calcium per day. Not to exceed 1200mg.  Continue 2000IU of Vitamin D per day.  Weight bearing exercise.  Recheck DEXA 11/2025  She has had a significant decline in her hip density. VFA old T11 compression fracture.

## 2024-07-12 NOTE — ASSESSMENT & PLAN NOTE
Right knee steroid injection: 4/11/24    Plan:    Medrol taper sent for patient to start Sunday, when she is back from camping,  for edematous left knee after fall.  She was cautioned to watch her glucose closely.  She verbalized understanding.    After explaining the risks and benefits of joint injection, including but not limited to, tendon atrophy, tendon rupture, thinning of the skin, bruising, bleeding, and infection, oral consent was obtained.  The patient tolerated the procedure well and had immediate hemostasis.  See injection note.  No other diagnoses discussed today.

## 2024-07-12 NOTE — ASSESSMENT & PLAN NOTE
Chief Complaint   Patient presents with   • Shortness of Breath     pt found 45 min PTA by nursing home staff \"guppy breathing\", intermittently responsive to pain     History Of Present Illness  Girish is a 80 year old female presenting with  past medical history significant for Parkinson's disease, Lewy body dementia, intracerebral hemorrhage, hypertension, thyroid disease, epilepsy, chronic pain, anxiety, who presents to the emergency department from  [facility] due to respiratory distress. Patient unable to provide history. Patient is alert and oriented x1 at baseline.     Per EMS, patient was found 45 minutes prior to arrival in significant respiratory distress. She was reportedly saturating 80% on room air and was placed on 4 L improving her oxygen saturations to 92%.     Upon arrival to the emergency department patient was found to in severe respiratory distress with stridorous breathing. Patient was placed on BiPAP to increase respiratory effort. /61, She was swabbed for COVID-19. Initial labs ABG 7.35/50 4/28, BUN 29, alk phos 140, COVID labs unremarkable.  WBC 13.  Pro-Roderick negative.  Lactic acid 1.9.  CRP 2.5.  UMS559, NT proBNP 314, troponin negative x1.  CXR unremarkable.  CT head without contrast showed no acute abnormalities.  Started on cefepime/doxy in the ED.  DuoNeb and racemic epi was given.  EKG limited by significant motion artifact but appears to show sinus tach at a rate of 145 without acute ST-T wave changes. Troponin negative. ProBNP 314, procalcitonin negative. Patient was transferred to MICU for further eval and management.     Past Medical History  Past Medical History:   Diagnosis Date   • Anxiety    • Hypertension    • Intracerebral hemorrhage (CMS/HCC)    • Lewy body dementia (CMS/HCC)    • Parkinson's disease (CMS/HCC)    • Seizure disorder (CMS/HCC)    • Thyroid disease         Surgical History  Past Surgical History:   Procedure Laterality Date   • Gastrostomy    Prior exposure and treatment with INH in 1987.  QTB negative 1/2022          Social History  Social History     Tobacco Use   • Smoking status: Never Smoker   • Smokeless tobacco: Never Used   Substance Use Topics   • Alcohol use: Not Currently   • Drug use: Never       Family History  No family history on file.     Allergies  ALLERGIES:  Patient has no known allergies.    Medications  Medications Prior to Admission   Medication Sig Dispense Refill   • levETIRAcetam (KepPRA) 100 MG/ML oral solution 250 mg by Per G Tube route 2 times daily.     • omeprazole 10 MG/ML oral suspension Take 20 mg by mouth daily.     • ketoconazole (NIZORAL) 2 % shampoo Apply 1 application topically 2 days a week. Apply to scalp topically every Wed and Sat for 2 months (8/29/2020-10/29/2020)     • methIMAzole (TAPAZOLE) 5 MG tablet 5 mg by Per G Tube route 3 times daily.     • ipratropium-albuterol (DUONEB) 0.5-2.5 (3) MG/3ML nebulizer solution Take 3 mLs by nebulization every 6 hours as needed for Wheezing or Shortness of Breath.     • docusate sodium 100 MG tablet Take 100 mg by mouth daily.     • melatonin 3 MG 3 mg by Per G Tube route at bedtime as needed (insomnia).     • losartan (COZAAR) 50 MG tablet 50 mg by Per G Tube route daily.     • amLODIPine (NORVASC) 5 MG tablet Take 5 mg by mouth daily. Hold if SBP<110     • acetaminophen (TYLENOL) 500 MG tablet 1,000 mg by Per G Tube route every 6 hours as needed for Pain or Fever.     • busPIRone (BUSPAR) 10 MG tablet 10 mg by Per G Tube route every evening.     • ARTIFICIAL TEAR SOLUTION OP Apply 1 drop to eye 2 times daily.     • carbidopa-levodopa (SINEMET)  MG per tablet 2 tablets by Per G Tube route 3 times daily.     • NON FORMULARY 30 mLs by Per G Tube route daily. UTI Stat Cranberry supplement     • hydroCORTisone (ANUSOL-HC) 2.5 % rectal cream Place 1 application rectally 2 times daily as needed for Hemorrhoids.     • bisacodyl (DULCOLAX) 10 MG suppository Place 10 mg rectally daily as needed for Constipation.         Review of  Systems  Unable to assess due to patient condition      Last Recorded Vitals  Blood pressure 128/61, pulse (!) 109, temperature 98.2 °F (36.8 °C), temperature source Tympanic, resp. rate (!) 30, weight 52.5 kg (115 lb 11.9 oz), SpO2 98 %.    Physical Exam  Vitals signs reviewed.   Constitutional:       General: She is not in acute distress.     Comments: On BiPAP   HENT:      Head: Atraumatic.      Nose: Nose normal.      Mouth/Throat:      Mouth: Mucous membranes are dry.   Eyes:      Pupils: Pupils are equal, round, and reactive to light.   Neck:      Musculoskeletal: Normal range of motion and neck supple.   Cardiovascular:      Rate and Rhythm: Tachycardia present.      Heart sounds: No murmur.   Pulmonary:      Comments: On BiPAP, decreased breath b/l  Abdominal:      General: Bowel sounds are normal.      Palpations: Abdomen is soft.   Musculoskeletal:      Comments: Contracted    Skin:     General: Skin is warm and dry.   Neurological:      Comments: Residual right sided weakness, not following commands         Imaging  09/28/20   CT HEAD WO CONTRAST  Narrative  EXAM/TECHNIQUE: CT HEAD WO CONTRAST. Adjustment of the mA and/or kV was done according to patient's size.CLINICAL INDICATION: Injury.COMPARISON: 11/21/2019.FINDINGS: There are some motion artifacts which can mask subtle hemorrhages.No demonstrated acute intracranial hemorrhage, infarction or tumor. There is mild cortical atrophy.  In the right occipital lobe there is small to moderate size encephalomalacia from an old infarction.  Advanced chronic microvascular ischemic changes of cerebral white matter present. No other focal parenchymal or extra-axial lesions.No demonstrated calvarial fractures.    Impression  No demonstrated acute abnormalities. Electronically Signed by: ERICK DARNELL M.D. Signed on: 9/28/2020 9:09 AM       LAST EKG:  Encounter Date: 09/28/20   Electrocardiogram 12-Lead   Result Value    Ventricular Rate EKG/Min (BPM) 145     Atrial Rate (BPM) 129    ME-Interval (MSEC) 124    QRS-Interval (MSEC) 80    QT-Interval (MSEC) 278    QTc 432    P Axis (Degrees) 26    R Axis (Degrees) -28    REPORT TEXT      Sinus tachycardia  Premature ventricular complex  Inferior infarct, old  Instability of baseline precludes confident interpretation  Confirmed by fellow ANA MARÍA SANTIAGO MD ([id number]) on 9/28/2020 9:43:02 AM       LAST X-RAY:  09/28/20   XR CHEST PA OR AP 1 VIEW  Narrative  Exam: Portable chest x-ray.CLINICAL HISTORY: Shortness of breath.TECHNIQUE: A single portable frontal upright view of the chest was performed.COMPARISON: Chest x-ray from 12/15/2019.FINDINGS:Feeding tube has been removed.  Lung volumes remain low.  There is probable basilar atelectasis.  Heart size is stable.  There is no evidence of pulmonary vascular congestion.  Impression  Low lung volumes with probable basilar atelectasis.Electronically Signed by: BHARTI FAIRBANKS M.D. Signed on: 9/28/2020 6:55 AM      Labs     Last WBC:  WBC   Date Value Ref Range Status   09/28/2020 13.0 (H) 4.2 - 11.0 K/mcL Final     LAST RBC:  RBC   Date Value Ref Range Status   09/28/2020 3.79 (L) 4.00 - 5.20 mil/mcL Final     LAST HCT:  HCT   Date Value Ref Range Status   09/28/2020 37.6 36.0 - 46.5 % Final     LAST HGB:  HGB   Date Value Ref Range Status   09/28/2020 12.3 12.0 - 15.5 g/dL Final     LAST PLT:  PLT   Date Value Ref Range Status   09/28/2020 285 140 - 450 K/mcL Final     LAST SODIUM:  Sodium   Date Value Ref Range Status   09/28/2020 140 135 - 145 mmol/L Final     LAST POTASSIUM:  Potassium   Date Value Ref Range Status   09/28/2020 4.3 3.4 - 5.1 mmol/L Final     LAST CHLORIDE:  Chloride   Date Value Ref Range Status   09/28/2020 107 98 - 107 mmol/L Final     LAST GLUCOSE:  Glucose   Date Value Ref Range Status   09/28/2020 133 (H) 65 - 99 mg/dL Final     LAST CALCIUM:  CALCIUM   Date Value Ref Range Status   09/28/2020 9.4 8.4 - 10.2 mg/dL Final     LAST CO2:  Carbon Dioxide   Date  Value Ref Range Status   09/28/2020 29 21 - 32 mmol/L Final     LAST BUN:  BUN   Date Value Ref Range Status   09/28/2020 29 (H) 6 - 20 mg/dL Final     LAST CREATININE:  Creatinine   Date Value Ref Range Status   09/28/2020 0.65 0.51 - 0.95 mg/dL Final       Current Facility-Administered Medications   Medication   • IPRATROPIUM-ALBUTEROL 0.5-2.5 (3) MG/3ML IN SOLN Pyxis Override   • acetaminophen (TYLENOL) tablet 650 mg   • carbidopa-levodopa (SINEMET)  MG per tablet 2 tablet   • busPIRone (BUSPAR) tablet 10 mg   • levETIRAcetam (KepPRA) 100 MG/ML oral solution 250 mg   • losartan (COZAAR) tablet 50 mg   • melatonin tablet 3 mg   • methIMAzole (TAPAZOLE) tablet 5 mg   • sodium chloride 0.9 % flush bag 25 mL   • sodium chloride (PF) 0.9 % injection 2 mL   • sodium chloride 0.9% infusion   • sodium chloride 0.9% infusion   • enoxaparin (LOVENOX) injection 40 mg   • amLODIPine (NORVASC) tablet 5 mg   • ampicillin-sulbactam (UNASYN) 1.5 g in sodium chloride 0.9 % 100 mL IVPB   • albuterol inhaler 2 puff         Assessment & Plan   SOB  Acute hypoxic respiratory failure possibly 2/2 viral infection versus increased secretion and anxiety leading to aspiration pneumonia vs CAP, no hx COPD  Leukocytosis   Tachycardia   Hypertension   Hx. Intracerebral hemorrhage with right sided residual   Lewy body dementia  Parkinson's disease   Seizure disorder  Thyroid disease    Anxiety  Dysphagia s/p PEG   Chronic anemia   Severe protein calorie malnutrition     Plan:   - continue home meds  - monitor respiratory status   - monitor labs   - empirically started on abx   - blood culture x 2 pending    - continue droplet and contact isolation until r/o COVID   - Dietitian on consult   - monitor accu-check     DVT Prophylaxis  Lovenox     Code Status    Code Status: Full Resuscitation    Primary Care Physician  Carlos BRASHER MD    Spent 55 minutes of which >50% spent on coordinating care. Discussed with AP Dr. Cisse

## 2024-07-12 NOTE — ASSESSMENT & PLAN NOTE
CCP>250.  Previously on methotrexate 6 tablets once weekly.   Did not tolerate Arava due to headaches but it did help her joints greatly.  1/18 foot films show early degenerative changes in the left foot/heel spur.  1/18 Hand films show mild degenerative changes of the pip's and dip's.   Took mtx for two years and stopped 5/19.  On Xeljanz since 12/18- lost efficacy in 2/2023  Started Orencia 8/2023.  Knee films 11/23  In both knees there is almost bone on bone bilaterally consistent with an inflammatory arthritis.  There is significant degenerative change of both patellofemoral joints with spurs.  There is a previous surgery of the left leg for a mid femoral fracture.  She has pain in the R knee. Bone on Bone medial and lateral compartment    Plan:    Continue Orencia.   No nsaids secondary to CAD.  Increase mtx to 6 tablets weekly.   Tylenol Arthritis 2 twice daily - [employer] generic or BRAND.  Can schedule R knee injection

## 2024-07-12 NOTE — ASSESSMENT & PLAN NOTE
Orencia - well tolerated and partially effective  Mtx - well tolerated. 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    Plan:    Cbc,Cmp Q 2 months- 3/24 CBC normal. CMP not received.       Would hold Orencia and mtx for any infection or illness, Seek treatment and when well and illness is resolved you may restart medication.  Hold mtx 1-2 weeks after covid booster.

## 2024-07-17 ENCOUNTER — TELEPHONE (OUTPATIENT)
Age: 64
End: 2024-07-17
Payer: COMMERCIAL

## 2024-07-18 ENCOUNTER — OFFICE VISIT (OUTPATIENT)
Age: 64
End: 2024-07-18
Payer: COMMERCIAL

## 2024-07-18 ENCOUNTER — TELEPHONE (OUTPATIENT)
Age: 64
End: 2024-07-18

## 2024-07-18 VITALS
HEIGHT: 66 IN | HEART RATE: 60 BPM | SYSTOLIC BLOOD PRESSURE: 142 MMHG | WEIGHT: 177 LBS | BODY MASS INDEX: 28.45 KG/M2 | TEMPERATURE: 97.2 F | DIASTOLIC BLOOD PRESSURE: 68 MMHG

## 2024-07-18 DIAGNOSIS — M06.9 RHEUMATOID ARTHRITIS INVOLVING MULTIPLE SITES, UNSPECIFIED WHETHER RHEUMATOID FACTOR PRESENT: ICD-10-CM

## 2024-07-18 DIAGNOSIS — Z79.899 HIGH RISK MEDICATION USE: ICD-10-CM

## 2024-07-18 DIAGNOSIS — R53.83 OTHER FATIGUE: ICD-10-CM

## 2024-07-18 DIAGNOSIS — M17.0 BILATERAL PRIMARY OSTEOARTHRITIS OF KNEE: Primary | ICD-10-CM

## 2024-07-18 DIAGNOSIS — Z86.11 H/O TB (TUBERCULOSIS): ICD-10-CM

## 2024-07-18 DIAGNOSIS — M81.0 OSTEOPOROSIS WITHOUT CURRENT PATHOLOGICAL FRACTURE, UNSPECIFIED OSTEOPOROSIS TYPE: ICD-10-CM

## 2024-07-18 RX ORDER — FOLIC ACID 1 MG/1
1 TABLET ORAL DAILY
Qty: 90 TABLET | Refills: 1 | Status: SHIPPED | OUTPATIENT
Start: 2024-07-18 | End: 2025-02-13 | Stop reason: SDUPTHER

## 2024-07-18 RX ORDER — METHOTREXATE 2.5 MG/1
15 TABLET ORAL WEEKLY
Qty: 72 TABLET | Refills: 1 | Status: SHIPPED | OUTPATIENT
Start: 2024-07-18 | End: 2025-02-13 | Stop reason: SDUPTHER

## 2024-07-18 NOTE — PROGRESS NOTES
[facility] [place] [facility] [place] Visit     Office Follow Up      Date: 07/18/2024   Patient Name: Renetta Blanco  MRN: [id number]  YOB: 1960    Referring Physician: Brynn Martin, *     Chief Complaint   Patient presents with    Rheumatoid Arthritis     FOLLOW UP       History of Present Illness: Renetta Blanco is a 64 y.o. female who is here today for follow up on   History of Present Illness         Result Review :        Results            Subjective     Allergies   Allergen Reactions    Amlodipine Swelling     Only on max dose of 10 mg.    Codeine Other (See Comments)     nightmares    Adhesive Tape Rash         Current Outpatient Medications:     Abatacept (Orencia ClickJect) 125 MG/ML solution auto-injector, Inject 1 mL under the skin into the appropriate area as directed Every 7 (Seven) Days., Disp: , Rfl:     ascorbic acid (VITAMIN C) 1000 MG tablet, Take 1 tablet by mouth Daily., Disp: , Rfl:     aspirin 81 MG chewable tablet, Chew 1 tablet Daily., Disp: , Rfl:     atorvastatin (LIPITOR) 40 MG tablet, Take 1 tablet by mouth Every Night., Disp: 90 tablet, Rfl: 3    baclofen (LIORESAL) 10 MG tablet, Take 1 tablet by mouth As Needed., Disp: , Rfl:     cetirizine (zyrTEC) 10 MG tablet, Take 1 tablet by mouth Daily As Needed for Allergies., Disp: , Rfl:     Cholecalciferol 50 MCG (2000 UT) capsule, Take 50 mcg by mouth Daily., Disp: , Rfl:     clopidogrel (PLAVIX) 75 MG tablet, Take 1 tablet by mouth Daily., Disp: 90 tablet, Rfl: 3    coenzyme Q10 100 MG capsule, Take 1 capsule by mouth Daily., Disp: , Rfl:     denosumab (PROLIA) 60 MG/ML solution prefilled syringe syringe, Inject 1 mL under the skin into the appropriate area as directed Every 6 (Six) Months. Once every 6 months, Disp: 1 mL, Rfl: 0    diphenhydrAMINE (BENADRYL) 50 MG capsule, Take 1 capsule by mouth Every 6 (Six) Hours As Needed for Itching. Administer DIPHENHYDRAMINE HCL 50mg  IJ PRN (ORENCIA), Disp: , Rfl:     Dulaglutide (Trulicity) 0.75 MG/0.5ML solution pen-injector, Inject 0.75 mg under the skin into the appropriate area as directed 1 (One) Time Per Week., Disp: , Rfl:     famotidine (PEPCID) 40 MG tablet, TAKE 1 TABLET BY MOUTH AS NEEDED., Disp: 120 tablet, Rfl: 0    Farxiga 10 MG tablet, Take 10 mg by mouth Daily., Disp: , Rfl:     folic acid (FOLVITE) 1 MG tablet, Take 1 tablet by mouth Daily., Disp: , Rfl:     Lancets (OneTouch Delica Plus [id number]) misc, 2 (Two) Times a Day. use for testing, Disp: , Rfl:     levocetirizine (XYZAL) 5 MG tablet, Take 1 tablet by mouth As Needed., Disp: , Rfl:     methotrexate 2.5 MG tablet, Take 6 tablets by mouth 1 (One) Time Per Week., Disp: 24 tablet, Rfl: 2    methylPREDNISolone (MEDROL) 4 MG dose pack, Take  by mouth Daily. follow package directions, Disp: , Rfl:     metoprolol tartrate (LOPRESSOR) 25 MG tablet, Take 0.5 tablets by mouth 2 (Two) Times a Day., Disp: 90 tablet, Rfl: 3    mometasone (NASONEX) 50 MCG/ACT nasal spray, 2 sprays into the nostril(s) as directed by provider Daily., Disp: , Rfl:     montelukast (SINGULAIR) 10 MG tablet, Take 1 tablet by mouth As Needed., Disp: , Rfl:     Omega-3 Fatty Acids (fish oil) 1000 MG capsule capsule, Take 1 capsule by mouth Daily With Breakfast., Disp: , Rfl:     OneTouch Verio test strip, USE TO TEST BLOOD SUGAR TWICE DAILY, Disp: , Rfl:     sertraline (ZOLOFT) 25 MG tablet, Take 1 tablet by mouth Daily., Disp: , Rfl:     Zinc 50 MG tablet, Take 1 tablet by mouth Daily., Disp: , Rfl:     glyburide (DIAbeta) 5 MG tablet, Take 1 tablet by mouth 2 (Two) Times a Day. (Patient not taking: Reported on 7/18/2024), Disp: , Rfl:     Past Medical History:   Diagnosis Date    Anxiety     Arthritis     OA and RA    CAD (coronary artery disease)     Diabetes mellitus     Heart murmur     History of transfusion     1978    Hypertension     NSTEMI (non-ST elevated myocardial infarction)     Osteoarthritis  of knee     Rheumatoid arthritis     Seasonal allergies     Vitamin D deficiency         Past Surgical History:   Procedure Laterality Date    CARDIAC CATHETERIZATION N/A 06/14/2023    Procedure: Left Heart Cath;  Surgeon: Enrique Alvarez MD;  [facility];  Service: Cardiology;  Laterality: N/A;    CHOLECYSTECTOMY      CORONARY ARTERY BYPASS GRAFT N/A 06/16/2023    Procedure: MEDIAN STERNOTOMY, CORONARY ARTERY BYPASS GRAFTING X 2, UTILIZING THE LEFT INTERNAL MAMMARY ARTERY  WITH EVH OF THE RIGHT GREATER SAPHENOUS VEIN AND SAGE PER ANESTHESIA;  Surgeon: Pawan Andrade MD;  Location:  [facility];  Service: Cardiothoracic;  Laterality: N/A;    FEMUR SURGERY Left     has metal ramona    GALLBLADDER SURGERY      TUBAL ABDOMINAL LIGATION         Family History   Problem Relation Age of Onset    Diabetes Mother     Cerebral aneurysm Father     Heart failure Father     Heart failure Sister     No Known Problems Maternal Grandmother     No Known Problems Maternal Grandfather     No Known Problems Paternal Grandmother     No Known Problems Paternal Grandfather         Social History     Socioeconomic History    Marital status: Significant Other    Number of children: 2   Tobacco Use    Smoking status: Never     Passive exposure: Past    Smokeless tobacco: Never   Vaping Use    Vaping status: Never Used   Substance and Sexual Activity    Alcohol use: Yes     Alcohol/week: 1.0 standard drink of alcohol     Types: 1 Drinks containing 0.5 oz of alcohol per week    Drug use: Never    Sexual activity: Defer       Review of Systems   Constitutional:  Negative for fatigue and fever.   HENT:  Negative for mouth sores, nosebleeds, swollen glands and trouble swallowing.    Eyes:  Negative for blurred vision, double vision, photophobia, pain and visual disturbance.   Respiratory:  Negative for apnea, cough, choking and shortness of breath.    Cardiovascular:  Negative for chest pain, palpitations and leg swelling.  "  Gastrointestinal:  Negative for abdominal pain, blood in stool, constipation, diarrhea, nausea, vomiting and GERD.   Endocrine: Negative for cold intolerance, heat intolerance, polydipsia, polyphagia and polyuria.        HAIR LOSS   Genitourinary:  Negative for difficulty urinating, dysuria, genital sores, hematuria and urinary incontinence.   Musculoskeletal:  Negative for arthralgias, back pain, gait problem, joint swelling, myalgias, neck pain, neck stiffness and bursitis.   Skin:  Negative for rash.   Allergic/Immunologic: Negative for environmental allergies and food allergies.   Neurological:  Negative for dizziness, tremors, seizures, syncope, weakness, numbness, headache, memory problem and confusion.   Hematological:  Negative for adenopathy. Bruises/bleeds easily.   Psychiatric/Behavioral:  Negative for sleep disturbance, suicidal ideas, depressed mood and stress. The patient is not nervous/anxious.         INSOMIA      I have reviewed and updated the patient's chief complaint, history of present illness, review of systems, past medical history, surgical history, family history, social history, medications and allergy list as appropriate.     Objective    Vital Signs:   Vitals:    07/18/24 1429   Weight: 80.3 kg (177 lb)   Height: 167.6 cm (66\")     Renetta Blanco reports a pain score of .  Given her pain assessment as noted, treatment options were discussed and the following options were decided upon as a follow-up plan to address the patient's pain: .      Body mass index is 28.57 kg/m².        Physical Exam   Physical Exam      Physical Exam  There is currently no information documented on the homunculus. Go to the Rheumatology activity and complete the homunculus joint exam.     Results Review:   Imaging Results (Last 24 Hours)       ** No results found for the last 24 hours. **            Procedures    Assessment / Plan    Assessment/Plan:   Diagnoses and all orders for this visit:    1. " Bilateral primary osteoarthritis of knee (Primary)  Assessment & Plan:  Right knee steroid injection: 4/11/24    Plan:    Medrol taper sent for patient to start Sunday, when she is back from camping,  for edematous left knee after fall.  She was cautioned to watch her glucose closely.  She verbalized understanding.    After explaining the risks and benefits of joint injection, including but not limited to, tendon atrophy, tendon rupture, thinning of the skin, bruising, bleeding, and infection, oral consent was obtained.  The patient tolerated the procedure well and had immediate hemostasis.  See injection note.  No other diagnoses discussed today.      2. Rheumatoid arthritis involving multiple sites, unspecified whether rheumatoid factor present  Assessment & Plan:  CCP>250.  Previously on methotrexate 6 tablets once weekly.   Did not tolerate Arava due to headaches but it did help her joints greatly.  1/18 foot films show early degenerative changes in the left foot/heel spur.  1/18 Hand films show mild degenerative changes of the pip's and dip's.   Took mtx for two years and stopped 5/19.  On Xeljanz since 12/18- lost efficacy in 2/2023  Started Orencia 8/2023.  Knee films 11/23  In both knees there is almost bone on bone bilaterally consistent with an inflammatory arthritis.  There is significant degenerative change of both patellofemoral joints with spurs.  There is a previous surgery of the left leg for a mid femoral fracture.  She has pain in the R knee. Bone on Bone medial and lateral compartment    Plan:    Continue Orencia.   No nsaids secondary to CAD.  Increase mtx to 6 tablets weekly.   Tylenol Arthritis 2 twice daily - [employer] generic or BRAND.  Can schedule R knee injection        3. Osteoporosis without current pathological fracture, unspecified osteoporosis type  Assessment & Plan:  Positive H/o vitamin D deficiency and steroid use. Positive RA,  Slip and fall Jan 2018 caused femur fracture - L , poor  bone healing and had to use a bone stimulator.  Bone density scan 8/18 showed Tscore of the R femoral neck -2.2, Total hip -2.0 and spine normal.  DEXA 11/21/2023 - R Total hip -3.9, femoral neck -4, femoral neck decreased by 35%, total decreased 35.8%, spine normal - unchanged. VFA old T11 compression fracture.   Last Prolia 9/2023 11/23 osteocalcin normal, SPEP negative, PTH normal, TSH and T4 normal, D 43.7    Plan:  800-1000mg of Calcium per day. Not to exceed 1200mg.  Continue 2000IU of Vitamin D per day.  Weight bearing exercise.  Recheck DEXA 11/2025  She has had a significant decline in her hip density. VFA old T11 compression fracture.         4. Other fatigue  Assessment & Plan:  Prior exposure and treatment with INH in 1987.  QTB negative 1/2022      5. High risk medication use  Assessment & Plan:  Orencia - well tolerated and partially effective  Mtx - well tolerated. 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    Plan:    Cbc,Cmp Q 2 months- 3/24 CBC normal. CMP not received.       Would hold Orencia and mtx for any infection or illness, Seek treatment and when well and illness is resolved you may restart medication.  Hold mtx 1-2 weeks after covid booster.               Assessment & Plan          Follow Up:   No follow-ups on file.         Cordelia Bill MD  [facility] [place] [facility]     Encounter Administratively Closed by Health Information Management

## 2024-07-18 NOTE — TELEPHONE ENCOUNTER
Patient has her [facility] to [facility] is there any way to know if refills are needed?

## 2024-07-24 ENCOUNTER — SPECIALTY PHARMACY (OUTPATIENT)
Age: 64
End: 2024-07-24
Payer: COMMERCIAL

## 2024-07-30 NOTE — TELEPHONE ENCOUNTER
Spoke with [facility], no refills needed at this time, delivery of Orencia to [facility] is on 8/7.

## 2024-11-08 ENCOUNTER — TELEPHONE (OUTPATIENT)
Age: 64
End: 2024-11-08
Payer: COMMERCIAL

## 2024-11-08 NOTE — TELEPHONE ENCOUNTER
PT HAD TO BE CANCELLED DUE TO A PROVIDER EMERGENCY. I HAVE REACHED OUT VIA [facility], [employer] [facility] AND PHONE. IF PT CALLS BACK TO RESCHEDULE, PLEASE SCHEDULE WITH ROBERTO CHRIS OR MURTAZA. IF THE PT WANTS TO SEE DR. LONG, SHE IS BOOKED UNTIL MARCH. IF IT IS A NEW PT, CONFRIM IF THEY ARE INTRESTED IN SEEING ANOTHER PROVIDER OR IF THEY WANT TO WAIT FOR DR. LONG. PT HAS BEEN ADDED TO THE CANCELLATION LIST AS HIGH PRIORITY.         -HUB READY TO SCHEDULE.

## 2024-11-27 ENCOUNTER — TELEPHONE (OUTPATIENT)
Dept: INTERNAL MEDICINE | Facility: CLINIC | Age: 64
End: 2024-11-27
Payer: COMMERCIAL

## 2024-11-27 ENCOUNTER — TELEPHONE (OUTPATIENT)
Age: 64
End: 2024-11-27

## 2024-11-27 NOTE — TELEPHONE ENCOUNTER
LMOM FOR PT THAT WE CAN DO INJ BUT WE CAN'T DO IT ON THE DAY SHE WOULD LIKE. WE CAN GET HER ON A WAITLIST OR SPEAK TO ROBERTO ABOUT SWITCH HER UPCOMING APPT FROM A FOLLOW UP TO AN INJ.    OKAY TO RELAY TO PT.    -HERMELINDO

## 2024-11-27 NOTE — TELEPHONE ENCOUNTER
Hub staff attempted to follow warm transfer process and was unsuccessful     Caller: Renetta Blanco    Relationship to patient: Self    Best call back number:[phone]    Patient is needing: DECEMBER THE 12TH IS HER PREFERRED DAY -- RETURNING CALL TO CHECK AND SEE IF POSSIBLE.

## 2024-11-27 NOTE — TELEPHONE ENCOUNTER
Hub staff attempted to follow warm transfer process and was unsuccessful     Caller: Renetta Blanco    Relationship to patient: Self    Best call back number:     Patient is needing: pt wanting to schedule injection December the 2th wants injection in right knee. Please call pt back if possible.

## 2025-01-14 ENCOUNTER — PATIENT MESSAGE (OUTPATIENT)
Age: 65
End: 2025-01-14
Payer: COMMERCIAL

## 2025-01-15 ENCOUNTER — TELEPHONE (OUTPATIENT)
Age: 65
End: 2025-01-15
Payer: COMMERCIAL

## 2025-01-15 NOTE — TELEPHONE ENCOUNTER
Pt called stating her insurance approved Orencia infusion, Ins will not pay for injectables and she needs to be in payment assistance program. Pt provided us with  Ph # to call to enroll her in program [phone]  The best # to contact pt [phone]

## 2025-01-17 ENCOUNTER — TELEPHONE (OUTPATIENT)
Age: 65
End: 2025-01-17
Payer: COMMERCIAL

## 2025-01-21 ENCOUNTER — TELEPHONE (OUTPATIENT)
Age: 65
End: 2025-01-21
Payer: COMMERCIAL

## 2025-01-21 NOTE — TELEPHONE ENCOUNTER
Hub staff attempted to follow warm transfer process and was unsuccessful     Caller: Renetta Blanco    Relationship to patient: Self    Best call back number: [phone]    Patient is needing: PATIENT IS WANTING TO SPEAK TO [facility] REGARDING HER PATIENT ASSISTANCE FORMS FOR ORENCIA THAT SHE HAS ALREADY FILLED OUT. SHE STATES THEY ARE NEEDING THESE FORMS ASAP. PLEASE ADVISE.

## 2025-01-22 ENCOUNTER — TELEPHONE (OUTPATIENT)
Age: 65
End: 2025-01-22
Payer: COMMERCIAL

## 2025-01-22 NOTE — TELEPHONE ENCOUNTER
I spoke with Orencia on call program who verified patient's enrollment as of 1/1/25. Faxed enrollment form to [facility].

## 2025-01-23 ENCOUNTER — TELEPHONE (OUTPATIENT)
Age: 65
End: 2025-01-23
Payer: COMMERCIAL

## 2025-01-23 NOTE — TELEPHONE ENCOUNTER
[facility] IS CALLING ABOUT THE PTS [employer] COPAY [employer]ISTANCE.        [employer] COPAY PROGRAM     [phone]

## 2025-01-24 NOTE — TELEPHONE ENCOUNTER
Contacted patient and she stated she cannot afford infusion.  We may have an option if we switch to subq with copay card.  Should I complete an prior authorization for Orencia subq?

## 2025-01-27 NOTE — TELEPHONE ENCOUNTER
Prior authorization initiated by [facility].  Update will be provided when a determination has been received.     Medication: Orencia Clickject  PA Submission Method: CMM  Case Number/CMM Key: [id number]

## 2025-01-28 ENCOUNTER — TELEPHONE (OUTPATIENT)
Age: 65
End: 2025-01-28

## 2025-01-28 NOTE — TELEPHONE ENCOUNTER
Hub staff attempted to follow warm transfer process and was unsuccessful     Caller: Renetta Blanco    Relationship to patient: Self    Best call back number: [phone]      Patient is needing: PT HAS QUESTIONS ABOUT HER INJECTIONS, LAST RECEIVED IT LAST MONTH. PLEASE CALL PT TO DISCUSS.

## 2025-02-10 NOTE — ASSESSMENT & PLAN NOTE
Orencia - well tolerated and partially effective  Mtx - well tolerated. 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    Plan:    Cbc,Cmp Q 2 months- 11/2024      Would hold Orencia and mtx for any infection or illness, Seek treatment and when well and illness is resolved you may restart medication.  Hold mtx 1-2 weeks after covid booster.

## 2025-02-10 NOTE — ASSESSMENT & PLAN NOTE
Positive H/o vitamin D deficiency and steroid use. Positive RA,  Slip and fall Jan 2018 caused femur fracture - L , poor bone healing and had to use a bone stimulator.  Bone density scan 8/18 showed Tscore of the R femoral neck -2.2, Total hip -2.0 and spine normal.  DEXA 11/21/2023 - R Total hip -3.9, femoral neck -4, femoral neck decreased by 35%, total decreased 35.8%, spine normal - unchanged. VFA old T11 compression fracture.   Last Prolia 2/5/2024 11/23 osteocalcin normal, SPEP negative, PTH normal, TSH and T4 normal, D 43.7    Plan:  800-1000mg of Calcium per day. Not to exceed 1200mg.  Continue 2000IU of Vitamin D per day.  Weight bearing exercise.  Recheck DEXA 11/2025  She has had a significant decline in her hip density. VFA old T11 compression fracture.

## 2025-02-10 NOTE — ASSESSMENT & PLAN NOTE
CCP>250.  Previously on methotrexate 6 tablets once weekly.   Did not tolerate Arava due to headaches but it did help her joints greatly.  1/18 foot films show early degenerative changes in the left foot/heel spur.  1/18 Hand films show mild degenerative changes of the pip's and dip's.   Took mtx for two years and stopped 5/19.  On Xeljanz since 12/18- lost efficacy in 2/2023  Started Orencia 8/2023.  Knee films 11/23  In both knees there is almost bone on bone bilaterally consistent with an inflammatory arthritis.  There is significant degenerative change of both patellofemoral joints with spurs.  There is a previous surgery of the left leg for a mid femoral fracture.      Plan:    Orencia samples have been given to patient as the copay is not affordable for infusions and injections have not been approved.  No samples available today.  Medrol dose pack sent in case of flare.  Orencia works very well for her.  She has improved 100% on Orencia.  She does not feel like she even has arthritis on Orencia.  We have sent a message to [facility] about getting assistance for Orencia.  No nsaids secondary to CAD.  Methotrexate tablets weekly.   She has one swollen joint, 4th PIP right hand  Tylenol Arthritis 2 twice daily - [employer] generic or BRAND.  Can schedule R knee injection

## 2025-02-12 NOTE — PROGRESS NOTES
Follow-up Visit      Date: 2025  Patient Name: Renetta Blanco  : 1960   MRN: [id number]     Chief Complaint:    Chief Complaint   Patient presents with    Coronary artery disease involving native coronary artery of       History of Present Illness: Renetta Blanco is a 64 y.o. female who is here today for follow-up on her coronary artery disease.  Patient has been doing fine since her bypass.    Patient occasionally have sternal problem but otherwise doing good.  Patient denies any lower extremity edema.  Patient denies any dizziness.  Patient denies any bleeding.  Patient is able to do all her activities without any problem.  Patient denies any weight loss or any weight gain.      Problem List     CARDIAC  Coronary Artery Disease:   Lexiscan 2023: Moderately reduced perfusion to the mid apical segment that is fixed.  LHC 2023: Severe left main disease  CABG x 2, 23: LIMA to LAD, SVG to the OM    Myocardium:   Echo 2023: EF normal     Valvular:   No significant valvular abnormalities     Electrical:   Normal sinus rhythm with PVCs     Percardium:   Normal    VASCULAR  Cardiovascular disease  Carotid duplex 2023: Less than 50% stenosis bilaterally    CARDIAC RISK FACTORS  Hypertension  Diabetes  2023 A1C 10.8  Dyslipidemia  2023 TG 51  LDL 73 HDL 52  10/2023   HDL 53 LDL 54  3/2024  TG 60 HDL 60 LDL 38  2024: TC 93 TG 90 HDL 46 LDL29  Obesity  Family History    NON-CARDIAC  Anxiety/depression  Rheumatoid arthritis    SURGERIES  Cholecystectomy  Tubal ligation  Femur fracture with ramona placement and left leg      Subjective      Review of Systems:   Review of Systems   Respiratory: Negative.     Cardiovascular: Negative.        Medications:     Current Outpatient Medications:     ascorbic acid (VITAMIN C) 1000 MG tablet, Take 1 tablet by mouth Daily., Disp: , Rfl:     aspirin 81 MG chewable tablet, Chew 1 tablet Daily., Disp: , Rfl:      atorvastatin (LIPITOR) 40 MG tablet, Take 1 tablet by mouth Every Night., Disp: 90 tablet, Rfl: 3    baclofen (LIORESAL) 10 MG tablet, Take 1 tablet by mouth As Needed., Disp: , Rfl:     cetirizine (zyrTEC) 10 MG tablet, Take 1 tablet by mouth Daily As Needed for Allergies., Disp: , Rfl:     Cholecalciferol 50 MCG (2000 UT) capsule, Take 50 mcg by mouth Daily., Disp: , Rfl:     clopidogrel (PLAVIX) 75 MG tablet, Take 1 tablet by mouth Daily., Disp: 90 tablet, Rfl: 3    coenzyme Q10 100 MG capsule, Take 1 capsule by mouth Daily., Disp: , Rfl:     denosumab (PROLIA) 60 MG/ML solution prefilled syringe syringe, Inject 1 mL under the skin into the appropriate area as directed Every 6 (Six) Months. Once every 6 months, Disp: 1 mL, Rfl: 0    diphenhydrAMINE (BENADRYL) 50 MG capsule, Take 1 capsule by mouth Every 6 (Six) Hours As Needed for Itching. Administer DIPHENHYDRAMINE HCL 50mg IJ PRN (ORENCIA), Disp: , Rfl:     Dulaglutide (Trulicity) 0.75 MG/0.5ML solution pen-injector, Inject 0.75 mg under the skin into the appropriate area as directed 1 (One) Time Per Week., Disp: , Rfl:     Farxiga 10 MG tablet, Take 10 mg by mouth Daily., Disp: , Rfl:     folic acid (FOLVITE) 1 MG tablet, Take 1 tablet by mouth Daily., Disp: 90 tablet, Rfl: 1    glyburide (DIAbeta) 5 MG tablet, Take 1 tablet by mouth 2 (Two) Times a Day., Disp: , Rfl:     Lancets (OneTouch Delica Plus [id number]) misc, 2 (Two) Times a Day. use for testing, Disp: , Rfl:     levocetirizine (XYZAL) 5 MG tablet, Take 1 tablet by mouth As Needed., Disp: , Rfl:     methotrexate 2.5 MG tablet, Take 6 tablets by mouth 1 (One) Time Per Week., Disp: 72 tablet, Rfl: 1    methylPREDNISolone (MEDROL) 4 MG dose pack, follow package directions, Disp: 1 each, Rfl: 0    metoprolol tartrate (LOPRESSOR) 25 MG tablet, Take 0.5 tablets by mouth 2 (Two) Times a Day., Disp: 90 tablet, Rfl: 3    mometasone (NASONEX) 50 MCG/ACT nasal spray, Administer 2 sprays into the nostril(s) as  "directed by provider Daily., Disp: , Rfl:     montelukast (SINGULAIR) 10 MG tablet, Take 1 tablet by mouth As Needed., Disp: , Rfl:     Omega-3 Fatty Acids (fish oil) 1000 MG capsule capsule, Take 1 capsule by mouth Daily With Breakfast., Disp: , Rfl:     ondansetron ODT (ZOFRAN-ODT) 4 MG disintegrating tablet, Place 1 tablet on the tongue Every 12 (Twelve) Hours As Needed., Disp: , Rfl:     OneTouch Verio test strip, USE TO TEST BLOOD SUGAR TWICE DAILY, Disp: , Rfl:     sertraline (ZOLOFT) 25 MG tablet, Take 1 tablet by mouth Daily., Disp: , Rfl:     Zinc 50 MG tablet, Take 1 tablet by mouth Daily., Disp: , Rfl:     Adalimumab-adaz 40 MG/0.4ML solution prefilled syringe, Inject 40 mg under the skin into the appropriate area as directed Every 14 (Fourteen) Days., Disp: 0.8 mL, Rfl: 5    famotidine (PEPCID) 40 MG tablet, Take 1 tablet by mouth Daily As Needed for Indigestion., Disp: 90 tablet, Rfl: 1    Allergies:   Allergies   Allergen Reactions    Amlodipine Swelling     Only on max dose of 10 mg.    Codeine Other (See Comments)     nightmares    Adhesive Tape Rash       Objective     Physical Exam:  Vitals:    02/13/25 1301   BP: 138/80   BP Location: Right arm   Patient Position: Sitting   Cuff Size: Adult   Pulse: 58   SpO2: 94%   Weight: 85.7 kg (189 lb)   Height: 167.6 cm (65.98\")     Body mass index is 30.52 kg/m².    Constitutional:       General: Not in acute distress.     Appearance: Healthy appearance. Not in distress.     Neck:     JVP:Not elevated     Carotid artery: Normal    Pulmonary:      Effort: Pulmonary effort is normal.      Breath sounds: Normal breath sounds. No wheezing. No rhonchi. No rales.     Cardiovascular:      Normal rate. Regular rhythm. Normal S1. Normal S2.      Murmurs: There is no significant murmur.      No gallop. No click. No rub.     Abdominal:      General: Bowel sounds are normal.      Palpations: Abdomen is soft.      Tenderness: There is no abdominal " tenderness.    Extremities:     Pulses:Normal radial and pedal pulses     Edema:no edema    Smoking Cessation:   Tobacco Product History : Patient never smoked    Lab Review:   Lab Results   Component Value Date    GLUCOSE 445 (C) 08/03/2023    BUN 18 08/03/2023    CREATININE 1.05 (H) 08/03/2023    BCR 17.1 08/03/2023    K 4.2 08/03/2023    CO2 27.3 08/03/2023    CALCIUM 9.9 08/03/2023    ALBUMIN 3.7 08/03/2023    AST 14 08/03/2023    ALT 16 08/03/2023     Lab Results   Component Value Date    WBC 8.77 08/03/2023    HGB 11.1 (L) 08/03/2023    HCT 36.5 08/03/2023    MCV 79.3 08/03/2023     08/03/2023           ECG 12 Lead    Date/Time: 2/13/2025 1:49 PM  Performed by: nErique Alvarez MD    Authorized by: Enrique Alvarez MD  Comparison: compared with previous ECG from 2/8/2024  Similar to previous ECG  Comparison to previous ECG: PVC are seen  Rhythm: sinus rhythm  T elevation: aVL  Other findings: non-specific ST-T wave changes    Clinical impression: abnormal EKG           Assessment / Plan      Assessment:   Diagnosis Plan   1. NSTEMI (non-ST elevated myocardial infarction)  ECG 12 Lead      2. Primary hypertension        3. Mixed hyperlipidemia             Plan:  Patient has been doing fine with her coronary artery disease.  Will continue patient on aspirin and Plavix.  Patient blood pressure has been under good control and we will continue with the current medications  Patient lipids have been under great control and continue on the current medications at this time.      Follow Up:       Return in about 1 year (around 2/13/2026).    Enrique Alvarez MD

## 2025-02-13 ENCOUNTER — OFFICE VISIT (OUTPATIENT)
Age: 65
End: 2025-02-13
Payer: COMMERCIAL

## 2025-02-13 ENCOUNTER — TELEPHONE (OUTPATIENT)
Age: 65
End: 2025-02-13

## 2025-02-13 ENCOUNTER — OFFICE VISIT (OUTPATIENT)
Dept: CARDIOLOGY | Facility: CLINIC | Age: 65
End: 2025-02-13
Payer: COMMERCIAL

## 2025-02-13 VITALS
OXYGEN SATURATION: 94 % | HEIGHT: 66 IN | BODY MASS INDEX: 30.37 KG/M2 | SYSTOLIC BLOOD PRESSURE: 138 MMHG | HEART RATE: 58 BPM | WEIGHT: 189 LBS | DIASTOLIC BLOOD PRESSURE: 80 MMHG

## 2025-02-13 VITALS
DIASTOLIC BLOOD PRESSURE: 60 MMHG | TEMPERATURE: 97.7 F | SYSTOLIC BLOOD PRESSURE: 122 MMHG | WEIGHT: 186 LBS | BODY MASS INDEX: 29.89 KG/M2 | HEIGHT: 66 IN | HEART RATE: 59 BPM

## 2025-02-13 DIAGNOSIS — Z79.899 HIGH RISK MEDICATION USE: ICD-10-CM

## 2025-02-13 DIAGNOSIS — M17.0 BILATERAL PRIMARY OSTEOARTHRITIS OF KNEE: ICD-10-CM

## 2025-02-13 DIAGNOSIS — M81.0 OSTEOPOROSIS WITHOUT CURRENT PATHOLOGICAL FRACTURE, UNSPECIFIED OSTEOPOROSIS TYPE: ICD-10-CM

## 2025-02-13 DIAGNOSIS — I10 PRIMARY HYPERTENSION: ICD-10-CM

## 2025-02-13 DIAGNOSIS — I21.4 NSTEMI (NON-ST ELEVATED MYOCARDIAL INFARCTION): Primary | ICD-10-CM

## 2025-02-13 DIAGNOSIS — E78.2 MIXED HYPERLIPIDEMIA: ICD-10-CM

## 2025-02-13 DIAGNOSIS — R53.83 OTHER FATIGUE: ICD-10-CM

## 2025-02-13 DIAGNOSIS — M05.79 RHEUMATOID ARTHRITIS INVOLVING MULTIPLE SITES WITH POSITIVE RHEUMATOID FACTOR: Primary | ICD-10-CM

## 2025-02-13 PROCEDURE — 93000 ELECTROCARDIOGRAM COMPLETE: CPT | Performed by: INTERNAL MEDICINE

## 2025-02-13 PROCEDURE — 99214 OFFICE O/P EST MOD 30 MIN: CPT | Performed by: NURSE PRACTITIONER

## 2025-02-13 PROCEDURE — 99213 OFFICE O/P EST LOW 20 MIN: CPT | Performed by: INTERNAL MEDICINE

## 2025-02-13 RX ORDER — METHYLPREDNISOLONE 4 MG/1
TABLET ORAL
Qty: 1 EACH | Refills: 0 | Status: SHIPPED | OUTPATIENT
Start: 2025-02-13

## 2025-02-13 RX ORDER — ABATACEPT 250 MG/15ML
250 INJECTION, POWDER, LYOPHILIZED, FOR SOLUTION INTRAVENOUS ONCE
COMMUNITY
Start: 2024-12-18 | End: 2025-02-13

## 2025-02-13 RX ORDER — METHOTREXATE 2.5 MG/1
15 TABLET ORAL WEEKLY
Qty: 72 TABLET | Refills: 1 | Status: SHIPPED | OUTPATIENT
Start: 2025-02-13

## 2025-02-13 RX ORDER — FAMOTIDINE 40 MG/1
40 TABLET, FILM COATED ORAL AS NEEDED
Qty: 120 TABLET | Refills: 1 | Status: SHIPPED | OUTPATIENT
Start: 2025-02-13 | End: 2025-02-14 | Stop reason: SDUPTHER

## 2025-02-13 RX ORDER — ONDANSETRON 4 MG/1
4 TABLET, ORALLY DISINTEGRATING ORAL EVERY 12 HOURS PRN
COMMUNITY
Start: 2024-12-10

## 2025-02-13 RX ORDER — FOLIC ACID 1 MG/1
1 TABLET ORAL DAILY
Qty: 90 TABLET | Refills: 1 | Status: SHIPPED | OUTPATIENT
Start: 2025-02-13

## 2025-02-13 NOTE — TELEPHONE ENCOUNTER
Caller:  [facility]        Best call back number: [phone]         Who are you requesting to speak with (clinical staff, provider,  specific staff member): CLINICAL         What was the call regarding: [facility] CONTACTED STATING THEY WOULD LIKE CLARIFICATION ON PRESCRIPTION SENT IN BEFORE REFILLING. PLEASE CONTACT PHARMACY AND ADVISE.

## 2025-02-13 NOTE — TELEPHONE ENCOUNTER
We have reached out to the [employer] drug rep to obtain samples. I sent patient a message letting her know we will call her as soon as we know what day these will be delivered.

## 2025-02-13 NOTE — TELEPHONE ENCOUNTER
Orencia is a plan exclusion for her insurance so they will not approve it, even through an appeal. We discovered this at the end of January. At that time we PA'ed adalimumab-adaz since she had not tried a TNF, this was approved by her insurance and that prescription was [facility] on [facility].25. What do we need to try to do going forward?

## 2025-02-13 NOTE — PROGRESS NOTES
Office Visit       Date: 02/13/2025   Patient Name: Renetta Blanco  MRN: [id number]  YOB: 1960    Referring Physician: Brynn Martin, *     Chief Complaint:   Chief Complaint   Patient presents with    Follow-up    Rheumatoid Arthritis       History of Present Illness: Renetta Blanco is a 64 y.o. female who is here today with rheumatoid arthritis.  She is a previous Dr. Bill patient.  Today she rates her pain 1 out of 10 and has 3 minutes of morning stiffness.  She is on her Orencia as her insurance would not cover injectable Biologics.  She would like a refill of all medications.      Subjective   Review of Systems:  Review of Systems   All other systems reviewed and are negative.       Past Medical History:   Past Medical History:   Diagnosis Date    Anxiety     Arthritis     OA and RA    CAD (coronary artery disease)     Diabetes mellitus     Heart murmur     History of transfusion     1978    Hypertension     NSTEMI (non-ST elevated myocardial infarction)     Osteoarthritis of knee     Rheumatoid arthritis     Seasonal allergies     Vitamin D deficiency        Past Surgical History:   Past Surgical History:   Procedure Laterality Date    CARDIAC CATHETERIZATION N/A 06/14/2023    Procedure: Left Heart Cath;  Surgeon: Enrique Alvarez MD;  Location:  [place] [facility] LOC[facility];  Service: Cardiology;  Laterality: N/A;    CHOLECYSTECTOMY      CORONARY ARTERY BYPASS GRAFT N/A 06/16/2023    Procedure: MEDIAN STERNOTOMY, CORONARY ARTERY BYPASS GRAFTING X 2, UTILIZING THE LEFT INTERNAL MAMMARY ARTERY  WITH EVH OF THE RIGHT GREATER SAPHENOUS VEIN AND SAGE PER ANESTHESIA;  Surgeon: Pawan Andrade MD;  [facility];  Service: Cardiothoracic;  Laterality: N/A;    FEMUR SURGERY Left     has metal ramona    GALLBLADDER SURGERY      TUBAL ABDOMINAL LIGATION         Family History:   Family History   Problem Relation Age of Onset    Diabetes Mother     Cerebral aneurysm Father      Heart failure Father     Heart failure Sister     No Known Problems Maternal Grandmother     No Known Problems Maternal Grandfather     No Known Problems Paternal Grandmother     No Known Problems Paternal Grandfather        Social History:   Social History     Socioeconomic History    Marital status: Significant Other    Number of children: 2   Tobacco Use    Smoking status: Never     Passive exposure: Past    Smokeless tobacco: Never   Vaping Use    Vaping status: Never Used   Substance and Sexual Activity    Alcohol use: Yes     Alcohol/week: 1.0 standard drink of alcohol     Types: 1 Drinks containing 0.5 oz of alcohol per week    Drug use: Never    Sexual activity: Defer       Medications:   Current Outpatient Medications:     ascorbic acid (VITAMIN C) 1000 MG tablet, Take 1 tablet by mouth Daily., Disp: , Rfl:     aspirin 81 MG chewable tablet, Chew 1 tablet Daily., Disp: , Rfl:     atorvastatin (LIPITOR) 40 MG tablet, Take 1 tablet by mouth Every Night., Disp: 90 tablet, Rfl: 3    baclofen (LIORESAL) 10 MG tablet, Take 1 tablet by mouth As Needed., Disp: , Rfl:     cetirizine (zyrTEC) 10 MG tablet, Take 1 tablet by mouth Daily As Needed for Allergies., Disp: , Rfl:     Cholecalciferol 50 MCG (2000 UT) capsule, Take 50 mcg by mouth Daily., Disp: , Rfl:     clopidogrel (PLAVIX) 75 MG tablet, Take 1 tablet by mouth Daily., Disp: 90 tablet, Rfl: 3    coenzyme Q10 100 MG capsule, Take 1 capsule by mouth Daily., Disp: , Rfl:     denosumab (PROLIA) 60 MG/ML solution prefilled syringe syringe, Inject 1 mL under the skin into the appropriate area as directed Every 6 (Six) Months. Once every 6 months, Disp: 1 mL, Rfl: 0    diphenhydrAMINE (BENADRYL) 50 MG capsule, Take 1 capsule by mouth Every 6 (Six) Hours As Needed for Itching. Administer DIPHENHYDRAMINE HCL 50mg IJ PRN (ORENCIA), Disp: , Rfl:     Dulaglutide (Trulicity) 0.75 MG/0.5ML solution pen-injector, Inject 0.75 mg under the skin into the appropriate area  as directed 1 (One) Time Per Week., Disp: , Rfl:     famotidine (PEPCID) 40 MG tablet, Take 1 tablet by mouth As Needed for Indigestion., Disp: 120 tablet, Rfl: 1    Farxiga 10 MG tablet, Take 10 mg by mouth Daily., Disp: , Rfl:     folic acid (FOLVITE) 1 MG tablet, Take 1 tablet by mouth Daily., Disp: 90 tablet, Rfl: 1    glyburide (DIAbeta) 5 MG tablet, Take 1 tablet by mouth 2 (Two) Times a Day., Disp: , Rfl:     Lancets (OneTouch Delica Plus [id number]) misc, 2 (Two) Times a Day. use for testing, Disp: , Rfl:     levocetirizine (XYZAL) 5 MG tablet, Take 1 tablet by mouth As Needed., Disp: , Rfl:     methotrexate 2.5 MG tablet, Take 6 tablets by mouth 1 (One) Time Per Week., Disp: 72 tablet, Rfl: 1    methylPREDNISolone (MEDROL) 4 MG dose pack, Take  by mouth Daily. follow package directions, Disp: , Rfl:     metoprolol tartrate (LOPRESSOR) 25 MG tablet, Take 0.5 tablets by mouth 2 (Two) Times a Day., Disp: 90 tablet, Rfl: 3    mometasone (NASONEX) 50 MCG/ACT nasal spray, Administer 2 sprays into the nostril(s) as directed by provider Daily., Disp: , Rfl:     montelukast (SINGULAIR) 10 MG tablet, Take 1 tablet by mouth As Needed., Disp: , Rfl:     Omega-3 Fatty Acids (fish oil) 1000 MG capsule capsule, Take 1 capsule by mouth Daily With Breakfast., Disp: , Rfl:     ondansetron ODT (ZOFRAN-ODT) 4 MG disintegrating tablet, Place 1 tablet on the tongue Every 12 (Twelve) Hours As Needed., Disp: , Rfl:     OneTouch Verio test strip, USE TO TEST BLOOD SUGAR TWICE DAILY, Disp: , Rfl:     Orencia 250 MG injection, Infuse 10 mL into a venous catheter 1 (One) Time., Disp: , Rfl:     sertraline (ZOLOFT) 25 MG tablet, Take 1 tablet by mouth Daily., Disp: , Rfl:     Zinc 50 MG tablet, Take 1 tablet by mouth Daily., Disp: , Rfl:     Allergies:   Allergies   Allergen Reactions    Amlodipine Swelling     Only on max dose of 10 mg.    Codeine Other (See Comments)     nightmares    Adhesive Tape Rash       I have reviewed and  "updated the patient's chief complaint, history of present illness, review of systems, past medical history, surgical history, family history, social history, medications and allergy list as appropriate.     Objective    Vital Signs:   Vitals:    02/13/25 0828   BP: 122/60   Pulse: 59   Temp: 97.7 °F (36.5 °C)   Weight: 84.4 kg (186 lb)   Height: 167.6 cm (66\")   PainSc:   2     Body mass index is 30.02 kg/m².   Defer to pcp       Physical Exam:  Physical Exam  Vitals reviewed.   Constitutional:       Appearance: Normal appearance.   HENT:      Head: Normocephalic and atraumatic.      Mouth/Throat:      Mouth: Mucous membranes are moist.   Eyes:      Conjunctiva/sclera: Conjunctivae normal.   Cardiovascular:      Rate and Rhythm: Normal rate and regular rhythm.      Pulses: Normal pulses.      Heart sounds: Normal heart sounds.   Pulmonary:      Effort: Pulmonary effort is normal.      Breath sounds: Normal breath sounds.   Musculoskeletal:         General: Normal range of motion.      Cervical back: Normal range of motion and neck supple.      Comments: Synovitis 4th pip right   Small bone spur? Right CMC  CMC squaring bilaterally  [place] bilaterally   Skin:     General: Skin is warm and dry.   Neurological:      General: No focal deficit present.      Mental Status: She is alert and oriented to person, place, and time. Mental status is at baseline.   Psychiatric:         Mood and Affect: Mood normal.         Behavior: Behavior normal.         Thought Content: Thought content normal.         Judgment: Judgment normal.          Results Review:   Imaging Results (Last 24 Hours)       ** No results found for the last 24 hours. **            Procedures    Assessment / Plan    Assessment/Plan:   Diagnoses and all orders for this visit:    1. Rheumatoid arthritis involving multiple sites with positive rheumatoid factor (Primary)  Assessment & Plan:  CCP>250.  Previously on methotrexate 6 tablets once weekly.   Did not " tolerate Arava due to headaches but it did help her joints greatly.  1/18 foot films show early degenerative changes in the left foot/heel spur.  1/18 Hand films show mild degenerative changes of the pip's and dip's.   Took mtx for two years and stopped 5/19.  On Xeljanz since 12/18- lost efficacy in 2/2023  Started Orencia 8/2023.  Knee films 11/23  In both knees there is almost bone on bone bilaterally consistent with an inflammatory arthritis.  There is significant degenerative change of both patellofemoral joints with spurs.  There is a previous surgery of the left leg for a mid femoral fracture.  She has pain in the R knee. Bone on Bone medial and lateral compartment    Plan:    Orencia samples have been given to patient as the copay is not affordable for infusions and injections have not been approved.  We will give samples toay if available.   Orencia works very well for her.  She has improved 100% on Orencia.  She does not feel like she even has arthritis on Orencia.  We have sent a message to Specialty pharmacy about getting assistance for Orencia.  No nsaids secondary to CAD.  Methotrexate tablets weekly.   She has one swollen joint, 4th PIP right hand  Tylenol Arthritis 2 twice daily - [employer] generic or BRAND.  Can schedule R knee injection      Orders:  -     methotrexate 2.5 MG tablet; Take 6 tablets by mouth 1 (One) Time Per Week.  Dispense: 72 tablet; Refill: 1  -     folic acid (FOLVITE) 1 MG tablet; Take 1 tablet by mouth Daily.  Dispense: 90 tablet; Refill: 1  -     C-reactive Protein; Standing  -     Sedimentation Rate; Standing  -     Comprehensive Metabolic Panel; Standing  -     CBC With Manual Differential; Standing    2. Bilateral primary osteoarthritis of knee  Assessment & Plan:  Right knee steroid injection: 4/11/24    Plan:    Medrol taper sent for patient to start Sunday, when she is back from camping,  for edematous left knee after fall.  She was cautioned to watch her glucose closely.   She verbalized understanding.    After explaining the risks and benefits of joint injection, including but not limited to, tendon atrophy, tendon rupture, thinning of the skin, bruising, bleeding, and infection, oral consent was obtained.  The patient tolerated the procedure well and had immediate hemostasis.  See injection note.  No other diagnoses discussed today.      3. Other fatigue  Assessment & Plan:  Prior exposure and treatment with INH in 1987.  QTB negative 1/2022      4. Osteoporosis without current pathological fracture, unspecified osteoporosis type  Assessment & Plan:  Positive H/o vitamin D deficiency and steroid use. Positive RA,  Slip and fall Jan 2018 caused femur fracture - L , poor bone healing and had to use a bone stimulator.  Bone density scan 8/18 showed Tscore of the R femoral neck -2.2, Total hip -2.0 and spine normal.  DEXA 11/21/2023 - R Total hip -3.9, femoral neck -4, femoral neck decreased by 35%, total decreased 35.8%, spine normal - unchanged. VFA old T11 compression fracture.   Last Prolia 2/5/2024 11/23 osteocalcin normal, SPEP negative, PTH normal, TSH and T4 normal, D 43.7    Plan:  800-1000mg of Calcium per day. Not to exceed 1200mg.  Continue 2000IU of Vitamin D per day.  Weight bearing exercise.  Recheck DEXA 11/2025  She has had a significant decline in her hip density. VFA old T11 compression fracture.         5. High risk medication use  Assessment & Plan:  Orencia - well tolerated and partially effective  Mtx - well tolerated. 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    Plan:    Cbc,Cmp Q 2 months- 11/2024      Would hold Orencia and mtx for any infection or illness, Seek treatment and when well and illness is resolved you may restart medication.  Hold mtx 1-2 weeks after covid booster.       Orders:  -     C-reactive Protein; Standing  -     Sedimentation Rate; Standing  -      Comprehensive Metabolic Panel; Standing  -     CBC With Manual Differential; Standing    Other orders  -     famotidine (PEPCID) 40 MG tablet; Take 1 tablet by mouth As Needed for Indigestion.  Dispense: 120 tablet; Refill: 1        Follow Up:   Return in about 4 months (around 6/13/2025) for TYESHA Guevara, TYESHA Marks.        TYESHA Perdomo  [facility]umat[facility]

## 2025-02-13 NOTE — TELEPHONE ENCOUNTER
Please let patient know if we will have Orencia samples by next Thursday so she can come pick some up.  She is off then.  Thanks.

## 2025-02-13 NOTE — TELEPHONE ENCOUNTER
[facility] is going to call [facility] regarding the adalimumab-adaz. I will call the patient back on 2/14 am to discuss what needs to be done so she can get medication.

## 2025-02-14 RX ORDER — ADALIMUMAB-ADAZ 40 MG/.4ML
40 INJECTION, SOLUTION SUBCUTANEOUS
Qty: 0.8 ML | Refills: 5 | Status: SHIPPED | OUTPATIENT
Start: 2025-02-14

## 2025-02-14 RX ORDER — FAMOTIDINE 40 MG/1
40 TABLET, FILM COATED ORAL DAILY PRN
Qty: 90 TABLET | Refills: 1 | Status: SHIPPED | OUTPATIENT
Start: 2025-02-14

## 2025-02-14 NOTE — TELEPHONE ENCOUNTER
Specialty Pharmacy Patient Management Program  Per Protocol Prescription Order/Refill     Patient currently fills medications at [facility] and is not enrolled in an Rheumatology Patient Management Program.     Requested Prescriptions     Signed Prescriptions Disp Refills    Adalimumab-adaz 40 MG/0.4ML solution prefilled syringe 0.8 mL 5     Sig: Inject 40 mg under the skin into the appropriate area as directed Every 14 (Fourteen) Days.     Authorizing Provider: GEORGE CHRIS     Ordering User: KELLI PARR     Prescription orders above were sent to the pharmacy per Collaborative Care Agreement Protocol.     Temo OdenD, BCPS  Clinical Specialty Pharmacist, Rheumatology   2/14/2025  09:28 EST

## 2025-02-14 NOTE — TELEPHONE ENCOUNTER
Prior authorization initiated by [facility].  Update will be provided when a determination has been received.     Medication: adalimumab-adaz auto injector  PA Submission Method: CMM  Case Number/CMM Key: [id number]

## 2025-02-17 NOTE — TELEPHONE ENCOUNTER
PA request has been approved and pharmacy notified (Filling pharmacy will be notified by phone, fax, or submitted prescription)    Authorized Medication: Adalimumab-adaz auto injector  Name of Insurance Approving PA: [facility]  Pharmacy PA Number: [id number]  PA Effective Dates: 02/14/25-02/13/26  Dispensing Pharmacy: [facility]

## 2025-06-19 RX ORDER — ATORVASTATIN CALCIUM 40 MG/1
40 TABLET, FILM COATED ORAL NIGHTLY
Qty: 90 TABLET | Refills: 3 | Status: SHIPPED | OUTPATIENT
Start: 2025-06-19

## 2025-06-19 RX ORDER — CLOPIDOGREL BISULFATE 75 MG/1
75 TABLET ORAL DAILY
Qty: 90 TABLET | Refills: 3 | Status: SHIPPED | OUTPATIENT
Start: 2025-06-19

## 2025-06-19 RX ORDER — METOPROLOL TARTRATE 25 MG/1
12.5 TABLET, FILM COATED ORAL EVERY 12 HOURS SCHEDULED
Qty: 90 TABLET | Refills: 3 | Status: SHIPPED | OUTPATIENT
Start: 2025-06-19

## 2025-07-08 NOTE — ASSESSMENT & PLAN NOTE
CCP>250.  Previously on methotrexate 6 tablets once weekly.   Did not tolerate Arava due to headaches but it did help her joints greatly.  1/18 foot films show early degenerative changes in the left foot/heel spur.  1/18 Hand films show mild degenerative changes of the pip's and dip's.   Took mtx for two years and stopped 5/19.  On Xeljanz since 12/18- lost efficacy in 2/2023  Started Orencia 8/2023.  Knee films 11/23  In both knees there is almost bone on bone bilaterally consistent with an inflammatory arthritis.  There is significant degenerative change of both patellofemoral joints with spurs.  There is a previous surgery of the left leg for a mid femoral fracture.      Plan:  No longer taking Orencia due to cost.  It did work well for hr.  She has been started on Adiluminab and this is working well.  She is getting new insurance and will make sure we accept this insurance as well as let us know in order to re-PA her medicine.  No nsaids secondary to CAD.  Methotrexate 6 tablets weekly.   Tylenol Arthritis 2 twice daily - [facility] generic or BRAND.

## 2025-07-08 NOTE — PROGRESS NOTES
Office Visit       Date: 07/10/2025   Patient Name: Renetta Blanco  MRN: [id number]  YOB: 1960    Referring Physician: No ref. provider found     Chief Complaint:   Chief Complaint   Patient presents with    Rheumatoid Arthritis       History of Present Illness: Renetta Blanco is a 65 y.o. female who is here today with rheumatoid arthritis.  She is a previous Dr. Bill patient.  Today she rates her pain 0/10, global 0/10 and has 2 to 3 minutes of morning stiffness.  She request refills of all medications today.  Her Prolia is given by her PCP but ordered by us.  She is not sure when last dose was.    Subjective   Review of Systems:  Review of Systems   All other systems reviewed and are negative.       Past Medical History:   Past Medical History:   Diagnosis Date    Anxiety     Arthritis     OA and RA    CAD (coronary artery disease)     Diabetes mellitus     Heart murmur     History of transfusion     1978    Hypertension     NSTEMI (non-ST elevated myocardial infarction)     Osteoarthritis of knee     Rheumatoid arthritis     Seasonal allergies     Vitamin D deficiency        Past Surgical History:   Past Surgical History:   Procedure Laterality Date    CARDIAC CATHETERIZATION N/A 06/14/2023    Procedure: Left Heart Cath;  Surgeon: Enrique Alvarez MD;  Location:  [facility];  Service: Cardiology;  Laterality: N/A;    CHOLECYSTECTOMY      CORONARY ARTERY BYPASS GRAFT N/A 06/16/2023    Procedure: MEDIAN STERNOTOMY, CORONARY ARTERY BYPASS GRAFTING X 2, UTILIZING THE LEFT INTERNAL MAMMARY ARTERY  WITH EVH OF THE RIGHT GREATER SAPHENOUS VEIN AND SAGE PER ANESTHESIA;  Surgeon: Pawan Andrade MD;  Location:  [facility];  Service: Cardiothoracic;  Laterality: N/A;    FEMUR SURGERY Left     has metal ramona    GALLBLADDER SURGERY      TUBAL ABDOMINAL LIGATION         Family History:   Family History   Problem Relation Age of Onset    Diabetes Mother     Cerebral aneurysm  Father     Heart failure Father     Heart failure Sister     No Known Problems Maternal Grandmother     No Known Problems Maternal Grandfather     No Known Problems Paternal Grandmother     No Known Problems Paternal Grandfather        Social History:   Social History     Socioeconomic History    Marital status: Significant Other    Number of children: 2   Tobacco Use    Smoking status: Never     Passive exposure: Past    Smokeless tobacco: Never   Vaping Use    Vaping status: Never Used   Substance and Sexual Activity    Alcohol use: Yes     Alcohol/week: 1.0 standard drink of alcohol     Types: 1 Drinks containing 0.5 oz of alcohol per week    Drug use: Never    Sexual activity: Defer       Medications:   Current Outpatient Medications:     Adalimumab-adaz 40 MG/0.4ML solution auto-injector, Inject  under the skin into the appropriate area as directed Every 14 (Fourteen) Days., Disp: , Rfl:     ascorbic acid (VITAMIN C) 1000 MG tablet, Take 1 tablet by mouth Daily., Disp: , Rfl:     aspirin 81 MG chewable tablet, Chew 1 tablet Daily., Disp: , Rfl:     atorvastatin (LIPITOR) 40 MG tablet, TAKE 1 TABLET BY MOUTH ONCE DAILY AT  NIGHT, Disp: 90 tablet, Rfl: 3    baclofen (LIORESAL) 10 MG tablet, Take 1 tablet by mouth As Needed., Disp: , Rfl:     cetirizine (zyrTEC) 10 MG tablet, Take 1 tablet by mouth Daily As Needed for Allergies., Disp: , Rfl:     Cholecalciferol 50 MCG (2000 UT) capsule, Take 50 mcg by mouth Daily., Disp: , Rfl:     clopidogrel (PLAVIX) 75 MG tablet, Take 1 tablet by mouth once daily, Disp: 90 tablet, Rfl: 3    coenzyme Q10 100 MG capsule, Take 1 capsule by mouth Daily., Disp: , Rfl:     denosumab (PROLIA) 60 MG/ML solution prefilled syringe syringe, Inject 1 mL under the skin into the appropriate area as directed Every 6 (Six) Months. Once every 6 months, Disp: 1 mL, Rfl: 0    diphenhydrAMINE (BENADRYL) 50 MG capsule, Take 1 capsule by mouth Every 6 (Six) Hours As Needed for Itching. Administer  DIPHENHYDRAMINE HCL 50mg IJ PRN (ORENCIA), Disp: , Rfl:     famotidine (PEPCID) 40 MG tablet, Take 1 tablet by mouth Daily As Needed for Indigestion., Disp: 90 tablet, Rfl: 1    Farxiga 10 MG tablet, Take 10 mg by mouth Daily., Disp: , Rfl:     folic acid (FOLVITE) 1 MG tablet, Take 1 tablet by mouth Daily., Disp: 90 tablet, Rfl: 1    glyburide (DIAbeta) 5 MG tablet, Take 1 tablet by mouth 2 (Two) Times a Day., Disp: , Rfl:     Lancets (OneTouch Delica Plus [id number]) misc, 2 (Two) Times a Day. use for testing, Disp: , Rfl:     Lantus SoloStar 100 UNIT/ML injection pen, INJECT 10 UNITS SUBCUTANEOUSLY ONCE DAILY IN THE EVENING FOR 28 DAYS, Disp: , Rfl:     levocetirizine (XYZAL) 5 MG tablet, Take 1 tablet by mouth As Needed., Disp: , Rfl:     methotrexate 2.5 MG tablet, Take 6 tablets by mouth 1 (One) Time Per Week., Disp: 72 tablet, Rfl: 1    methylPREDNISolone (MEDROL) 4 MG dose pack, follow package directions, Disp: 1 each, Rfl: 0    metoprolol tartrate (LOPRESSOR) 25 MG tablet, Take 1/2 (one-half) tablet by mouth twice daily, Disp: 90 tablet, Rfl: 3    mometasone (NASONEX) 50 MCG/ACT nasal spray, Administer 2 sprays into the nostril(s) as directed by provider Daily., Disp: , Rfl:     montelukast (SINGULAIR) 10 MG tablet, Take 1 tablet by mouth As Needed., Disp: , Rfl:     omega-3 acid ethyl esters (LOVAZA) 1 g capsule, Take 1 capsule (1 g) by mouth in the morning., Disp: , Rfl:     Omega-3 Fatty Acids (fish oil) 1000 MG capsule capsule, Take 1 capsule by mouth Daily With Breakfast., Disp: , Rfl:     ondansetron ODT (ZOFRAN-ODT) 4 MG disintegrating tablet, Place 1 tablet on the tongue Every 12 (Twelve) Hours As Needed., Disp: , Rfl:     OneTouch Verio test strip, USE TO TEST BLOOD SUGAR TWICE DAILY, Disp: , Rfl:     sertraline (ZOLOFT) 25 MG tablet, Take 1 tablet by mouth Daily., Disp: , Rfl:     Trulicity 3 MG/0.5ML solution auto-injector, INJECT 3 MG UNDER THE SKIN EVERY WEEK, Disp: , Rfl:     Zinc 50 MG tablet,  "Take 1 tablet by mouth Daily., Disp: , Rfl:     Allergies:   Allergies   Allergen Reactions    Amlodipine Swelling     Only on max dose of 10 mg.    Codeine Other (See Comments)     nightmares    Adhesive Tape Rash       I have reviewed and updated the patient's chief complaint, history of present illness, review of systems, past medical history, surgical history, family history, social history, medications and allergy list as appropriate.     Objective    Vital Signs:   Vitals:    07/10/25 0943   BP: 126/76   BP Location: Left arm   Patient Position: Sitting   Cuff Size: Adult   Pulse: 52   Temp: 96.5 °F (35.8 °C)   Weight: 80.6 kg (177 lb 9.6 oz)   Height: 167.6 cm (65.98\")   PainSc: 0-No pain       Body mass index is 28.68 kg/m².   Defer to pcp       Physical Exam:  Physical Exam  Vitals reviewed.   Constitutional:       Appearance: Normal appearance.   HENT:      Head: Normocephalic and atraumatic.      Mouth/Throat:      Mouth: Mucous membranes are moist.   Eyes:      Conjunctiva/sclera: Conjunctivae normal.   Cardiovascular:      Rate and Rhythm: Normal rate and regular rhythm.      Pulses: Normal pulses.      Heart sounds: Normal heart sounds.   Pulmonary:      Effort: Pulmonary effort is normal.      Breath sounds: Normal breath sounds.   Musculoskeletal:         General: Normal range of motion.      Cervical back: Normal range of motion and neck supple.      Comments: Small bone spur? Right CMC  CMC squaring bilaterally  [place] bilaterally  No synovitis  Bilateral knee crepitus   Skin:     General: Skin is warm and dry.   Neurological:      General: No focal deficit present.      Mental Status: She is alert and oriented to person, place, and time. Mental status is at baseline.   Psychiatric:         Mood and Affect: Mood normal.         Behavior: Behavior normal.         Thought Content: Thought content normal.         Judgment: Judgment normal.          Results Review:   Imaging Results (Last 24 Hours)  "      ** No results found for the last 24 hours. **            Procedures    Assessment / Plan      Assessment & Plan  Rheumatoid arthritis involving multiple sites with positive rheumatoid factor  CCP>250.  Previously on methotrexate 6 tablets once weekly.   Did not tolerate Arava due to headaches but it did help her joints greatly.  1/18 foot films show early degenerative changes in the left foot/heel spur.  1/18 Hand films show mild degenerative changes of the pip's and dip's.   Took mtx for two years and stopped 5/19.  On Xeljanz since 12/18- lost efficacy in 2/2023  Started Orencia 8/2023.  Knee films 11/23  In both knees there is almost bone on bone bilaterally consistent with an inflammatory arthritis.  There is significant degenerative change of both patellofemoral joints with spurs.  There is a previous surgery of the left leg for a mid femoral fracture.      Plan:  No longer taking Orencia due to cost.  It did work well for hr.  She has been started on Adiluminab and this is working well.  She is getting new insurance and will make sure we accept this insurance as well as let us know in order to re-PA her medicine.  No nsaids secondary to CAD.  Methotrexate 6 tablets weekly.   Tylenol Arthritis 2 twice daily - [employer] generic or BRAND.      High risk medication use  Adiluminab well tolerated and effective  Mtx - well tolerated. 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    Plan:    Cbc,Cmp Q 2 months- 6/2025    Osteoporosis without current pathological fracture, unspecified osteoporosis type  Positive H/o vitamin D deficiency and steroid use. Positive RA,  Slip and fall Jan 2018 caused femur fracture - L , poor bone healing and had to use a bone stimulator.  Bone density scan 8/18 showed Tscore of the R femoral neck -2.2, Total hip -2.0 and spine normal.  DEXA 11/21/2023 - R Total hip -3.9, femoral neck -4, femoral neck  decreased by 35%, total decreased 35.8%, spine normal - unchanged. VFA old T11 compression fracture.   Last Prolia 2/5/2024 11/23 osteocalcin normal, SPEP negative, PTH normal, TSH and T4 normal, D 43.7    Plan:  800-1000mg of Calcium per day. Not to exceed 1200mg.  Continue 2000IU of Vitamin D per day.  Weight bearing exercise.  Recheck DEXA 11/2025  She has had a significant decline in her hip density. VFA old T11 compression fracture.           Bilateral primary osteoarthritis of knee  Right knee steroid injection: 4/11/24  Stable    Other fatigue  Prior exposure and treatment with INH in 1987.  QTB negative 1/2022  Update  chest xray          Follow Up:   Return in about 4 months (around 11/10/2025) for TYESHA Guevara.        TYESHA Perdomo   [facility]

## 2025-07-08 NOTE — ASSESSMENT & PLAN NOTE
Adiluminab well tolerated and effective  Mtx - well tolerated. 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    Plan:    Cbc,Cmp Q 2 months- 6/2025

## 2025-07-10 ENCOUNTER — OFFICE VISIT (OUTPATIENT)
Age: 65
End: 2025-07-10
Payer: COMMERCIAL

## 2025-07-10 ENCOUNTER — TELEPHONE (OUTPATIENT)
Age: 65
End: 2025-07-10

## 2025-07-10 VITALS
BODY MASS INDEX: 28.54 KG/M2 | TEMPERATURE: 96.5 F | HEART RATE: 52 BPM | SYSTOLIC BLOOD PRESSURE: 126 MMHG | DIASTOLIC BLOOD PRESSURE: 76 MMHG | HEIGHT: 66 IN | WEIGHT: 177.6 LBS

## 2025-07-10 DIAGNOSIS — M05.79 RHEUMATOID ARTHRITIS INVOLVING MULTIPLE SITES WITH POSITIVE RHEUMATOID FACTOR: Primary | ICD-10-CM

## 2025-07-10 DIAGNOSIS — M17.0 BILATERAL PRIMARY OSTEOARTHRITIS OF KNEE: ICD-10-CM

## 2025-07-10 DIAGNOSIS — Z86.11 H/O TB (TUBERCULOSIS): ICD-10-CM

## 2025-07-10 DIAGNOSIS — M81.0 OSTEOPOROSIS WITHOUT CURRENT PATHOLOGICAL FRACTURE, UNSPECIFIED OSTEOPOROSIS TYPE: ICD-10-CM

## 2025-07-10 DIAGNOSIS — Z79.899 HIGH RISK MEDICATION USE: ICD-10-CM

## 2025-07-10 DIAGNOSIS — R53.83 OTHER FATIGUE: ICD-10-CM

## 2025-07-10 PROCEDURE — 99214 OFFICE O/P EST MOD 30 MIN: CPT | Performed by: NURSE PRACTITIONER

## 2025-07-10 RX ORDER — METHOTREXATE 2.5 MG/1
15 TABLET ORAL WEEKLY
Qty: 72 TABLET | Refills: 1 | Status: SHIPPED | OUTPATIENT
Start: 2025-07-10

## 2025-07-10 RX ORDER — ADALIMUMAB-ADAZ 40 MG/.4ML
INJECTION, SOLUTION SUBCUTANEOUS
COMMUNITY
Start: 2025-06-23 | End: 2025-07-10 | Stop reason: SDUPTHER

## 2025-07-10 RX ORDER — DULAGLUTIDE 3 MG/.5ML
INJECTION, SOLUTION SUBCUTANEOUS
COMMUNITY
Start: 2025-06-27

## 2025-07-10 RX ORDER — ADALIMUMAB-ADAZ 40 MG/.4ML
40 INJECTION, SOLUTION SUBCUTANEOUS
Qty: 0.8 ML | Refills: 5 | Status: SHIPPED | OUTPATIENT
Start: 2025-07-10

## 2025-07-10 RX ORDER — OMEGA-3-ACID ETHYL ESTERS 1 G/1
CAPSULE, LIQUID FILLED ORAL
COMMUNITY

## 2025-07-10 RX ORDER — INSULIN GLARGINE 100 [IU]/ML
INJECTION, SOLUTION SUBCUTANEOUS
COMMUNITY
Start: 2025-06-26

## 2025-07-10 RX ORDER — FAMOTIDINE 40 MG/1
40 TABLET, FILM COATED ORAL DAILY PRN
Qty: 90 TABLET | Refills: 1 | Status: SHIPPED | OUTPATIENT
Start: 2025-07-10

## 2025-07-10 RX ORDER — FOLIC ACID 1 MG/1
1 TABLET ORAL DAILY
Qty: 90 TABLET | Refills: 1 | Status: SHIPPED | OUTPATIENT
Start: 2025-07-10

## 2025-07-10 NOTE — TELEPHONE ENCOUNTER
Specialty Pharmacy Patient Management Program  Per Protocol Prescription Order/Refill     Patient currently fills medications at [facility] and is enrolled in an Rheumatology Patient Management Program.     Requested Prescriptions     Signed Prescriptions Disp Refills    Adalimumab-adaz 40 MG/0.4ML solution auto-injector 0.8 mL 5     Sig: Inject 40 mg under the skin into the appropriate area as directed Every 14 (Fourteen) Days.     Authorizing Provider: GEORGE CHRIS     Ordering User: KELLI PARR     Prescription orders above were sent to the pharmacy per Collaborative Care Agreement Protocol.

## 2025-07-21 ENCOUNTER — TELEPHONE (OUTPATIENT)
Age: 65
End: 2025-07-21
Payer: COMMERCIAL

## 2025-07-21 RX ORDER — ADALIMUMAB-ADAZ 40 MG/.4ML
40 INJECTION, SOLUTION SUBCUTANEOUS
Qty: 0.8 ML | Refills: 5 | Status: SHIPPED | OUTPATIENT
Start: 2025-07-21

## 2025-07-21 NOTE — TELEPHONE ENCOUNTER
Specialty Pharmacy Patient Management Program  Per Protocol Prescription Order/Refill     Patient currently fills medications at [facility] and is not enrolled in an Rheumatology Patient Management Program.     Requested Prescriptions     Signed Prescriptions Disp Refills    Adalimumab-adaz 40 MG/0.4ML solution auto-injector 0.8 mL 5     Sig: Inject 40 mg under the skin into the appropriate area as directed Every 14 (Fourteen) Days.     Authorizing Provider: GEORGE CHRIS     Ordering User: KELLI PARR     Prescription orders above were sent to the pharmacy per Collaborative Care Agreement Protocol.

## 2025-07-21 NOTE — TELEPHONE ENCOUNTER
Hub staff attempted to follow warm transfer process and was unsuccessful     Caller: [facility] - [place], [place] - [address] [facility] - [phone]  - [phone] FX    Relationship to patient: Pharmacy    Patient is needing: PT NEEDING PA FOR MEDICATION

## 2025-07-22 NOTE — TELEPHONE ENCOUNTER
Prior authorization initiated by [facility].  Update will be provided when a determination has been received.     Medication: adalimumab-adaz  PA Submission Method: CMM  Case Number/CMM Key: [id number]

## 2025-07-24 ENCOUNTER — SPECIALTY PHARMACY (OUTPATIENT)
Age: 65
End: 2025-07-24
Payer: COMMERCIAL

## 2025-08-06 ENCOUNTER — TELEPHONE (OUTPATIENT)
Age: 65
End: 2025-08-06
Payer: COMMERCIAL

## 2025-08-11 ENCOUNTER — TELEPHONE (OUTPATIENT)
Age: 65
End: 2025-08-11
Payer: COMMERCIAL

## 2025-08-27 ENCOUNTER — SPECIALTY PHARMACY (OUTPATIENT)
Age: 65
End: 2025-08-27
Payer: COMMERCIAL